# Patient Record
Sex: FEMALE | Race: WHITE | NOT HISPANIC OR LATINO | Employment: STUDENT | URBAN - METROPOLITAN AREA
[De-identification: names, ages, dates, MRNs, and addresses within clinical notes are randomized per-mention and may not be internally consistent; named-entity substitution may affect disease eponyms.]

---
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
## 2017-01-10 ENCOUNTER — OFFICE VISIT (OUTPATIENT)
Dept: FAMILY MEDICINE | Facility: CLINIC | Age: 22
End: 2017-01-10
Payer: COMMERCIAL

## 2017-01-10 VITALS
WEIGHT: 84.19 LBS | SYSTOLIC BLOOD PRESSURE: 104 MMHG | HEART RATE: 106 BPM | OXYGEN SATURATION: 99 % | BODY MASS INDEX: 15.9 KG/M2 | DIASTOLIC BLOOD PRESSURE: 72 MMHG | TEMPERATURE: 99 F | HEIGHT: 61 IN

## 2017-01-10 DIAGNOSIS — J01.00 ACUTE NON-RECURRENT MAXILLARY SINUSITIS: Primary | ICD-10-CM

## 2017-01-10 PROCEDURE — 1159F MED LIST DOCD IN RCRD: CPT | Mod: S$GLB,,, | Performed by: INTERNAL MEDICINE

## 2017-01-10 PROCEDURE — 99214 OFFICE O/P EST MOD 30 MIN: CPT | Mod: S$GLB,,, | Performed by: INTERNAL MEDICINE

## 2017-01-10 RX ORDER — MEDROXYPROGESTERONE ACETATE 150 MG/ML
INJECTION, SUSPENSION INTRAMUSCULAR
Refills: 0 | COMMUNITY
Start: 2016-11-28 | End: 2018-08-27 | Stop reason: ALTCHOICE

## 2017-01-10 RX ORDER — AMOXICILLIN AND CLAVULANATE POTASSIUM 875; 125 MG/1; MG/1
1 TABLET, FILM COATED ORAL 2 TIMES DAILY
Qty: 20 TABLET | Refills: 0 | Status: SHIPPED | OUTPATIENT
Start: 2017-01-10 | End: 2017-01-20

## 2017-01-10 NOTE — PROGRESS NOTES
Subjective:       Patient ID: Nova Johnson is a 21 y.o. female.    Medication List with Changes/Refills   New Medications    AMOXICILLIN-CLAVULANATE 875-125MG (AUGMENTIN) 875-125 MG PER TABLET    Take 1 tablet by mouth 2 (two) times daily.   Current Medications    ADALIMUMAB (HUMIRA PEN) 40 MG/0.8 ML PNKT    Inject 40 mg into the skin every 14 (fourteen) days.    CALCIUM CARBONATE-VIT D3-MIN (CALCIUM-VITAMIN D) 600 MG CALCIUM- 400 UNIT TAB    Take 1 tablet by mouth once daily.    MEDROXYPROGESTERONE (DEPO-PROVERA) 150 MG/ML INJECTION    INJECT 1 INTO THE MUSCLE EVERY 83 DAYS    VITAMIN D2 50,000 UNIT CAPSULE    Take 50,000 Units by mouth every 7 days.    VITAMIN D3 1,000 UNIT CAPSULE    Take 2,000 Units by mouth once daily.       Chief Complaint: Sinus Problem  She has history of Crohn's disease on humira.     She presents with 5 days of sinus pressure and congestion. She does have a frontal headache. No fevers. No chills.  She has some congestion but has not been able to get it out. Mild cough. No sore throat but has ear pressure left > right.  She has gotten a little better but still feels a lot of sinus pressure and congestion.  No vomiting. No diarrhea.  No shortness of breath or wheezing.      Review of Systems   Constitutional: Negative for activity change, appetite change, chills, fatigue and fever.   HENT: Positive for congestion, ear pain, postnasal drip and sinus pressure. Negative for ear discharge, mouth sores, rhinorrhea and sore throat.    Eyes: Negative for pain, discharge and redness.   Respiratory: Positive for cough. Negative for chest tightness, shortness of breath and wheezing.    Gastrointestinal: Negative for abdominal pain, constipation, diarrhea, nausea and vomiting.   Genitourinary: Negative for dysuria.   Musculoskeletal: Negative for arthralgias and neck stiffness.   Skin: Negative for rash.   Neurological: Positive for headaches.   Hematological: Negative for adenopathy.      "  Objective:      Vitals:    01/10/17 0930   BP: 104/72   BP Location: Right arm   Patient Position: Sitting   BP Method: Manual   Pulse: 106   Temp: 98.9 °F (37.2 °C)   TempSrc: Oral   SpO2: 99%   Weight: 38.2 kg (84 lb 3.5 oz)   Height: 5' 1" (1.549 m)     Body mass index is 15.91 kg/(m^2).  Physical Exam    General appearance: alert, no acute distress  Head: atraumatic  Eyes: PERRL, EMOI, normal conjunctiva, no drainage  Ears: tm left bulging with clear fluid, right tm normal, no erythema or pus, canals normal, external ear normal  Nose: boggy erythematous mucosa, no polyps or sores, no rhinorrhea, frontal sinus tenderness  Throat: no erythema, no exudates, tonsils appear normal  Mouth: no sores or lesion, moist mucous membranes  Neck: supple, FROM, no masses, no tenderness  Lymph: no posterior or cervical adenopathy  Lungs: no distress, no retractions, clear to ascultation bilaterally, no wheezing, no rales, no rhonchi  Heart:: Regular rate and rhythm, no murmur  Abdomen: soft, non-tender, no guarding, no rebound, no peritoneal signs, bowel sounds normal, no hepatosplenomegaly, no masses  Skin: no rashes or lesion  Perfusion: good capillary refill, normal pulses      Assessment:       1. Acute non-recurrent maxillary sinusitis        Plan:       Acute non-recurrent maxillary sinusitis  Five day of symptoms that have improved very little.  She continues with congestion. She is immunocompromised so lower clinical threshold to treat.  Will start augmentin bid x 10 days. Advised of the signs of worsening to return to clinic.   -     amoxicillin-clavulanate 875-125mg (AUGMENTIN) 875-125 mg per tablet; Take 1 tablet by mouth 2 (two) times daily.  Dispense: 20 tablet; Refill: 0      Return if symptoms worsen or fail to improve.      "

## 2017-03-07 ENCOUNTER — TELEPHONE (OUTPATIENT)
Dept: PEDIATRIC GASTROENTEROLOGY | Facility: CLINIC | Age: 22
End: 2017-03-07

## 2017-03-07 NOTE — TELEPHONE ENCOUNTER
Called and spoke with mom.  Discussed IBD Clinic starting in March, and asked mom if this is something she and the patient would be interested in.  Mom states pt is now following an adult GI MD.  However, she will discuss with pt and call back if wishing to schedule.

## 2017-07-10 ENCOUNTER — PATIENT MESSAGE (OUTPATIENT)
Dept: PEDIATRIC GASTROENTEROLOGY | Facility: CLINIC | Age: 22
End: 2017-07-10

## 2018-08-09 ENCOUNTER — TELEPHONE (OUTPATIENT)
Dept: GASTROENTEROLOGY | Facility: CLINIC | Age: 23
End: 2018-08-09

## 2018-08-09 NOTE — TELEPHONE ENCOUNTER
----- Message from Scar Singh sent at 8/9/2018  1:46 PM CDT -----  Pt is being referred to Dr Collazo. I have scanned the referral and records into media mgr within [facility]. Please review and contact pt for scheduling,thanks

## 2018-08-10 ENCOUNTER — TELEPHONE (OUTPATIENT)
Dept: GASTROENTEROLOGY | Facility: CLINIC | Age: 23
End: 2018-08-10

## 2018-08-10 NOTE — TELEPHONE ENCOUNTER
----- Message from Patti Mckinley sent at 8/10/2018 11:08 AM CDT -----  Contact: Self- [phone]  Zay- pt called to check the status of her np appt with the IBD clinic- please contact pt at [phone]

## 2018-08-10 NOTE — TELEPHONE ENCOUNTER
Pt is scheduled for a new patient clinic appt with Dr. AARON Collazo on 8/27/2018.     E-mail sent to pt with appt reminder, new patient welcome letter as well as a campus map.    Referring MD's office notified of appt date.

## 2018-08-24 NOTE — PROGRESS NOTES
[facility]          Inflammatory Bowel Disease New Patient Consultation Note         TODAY'S VISIT DATE:  8/24/2018    Reason for Consult:    Chief Complaint   Patient presents with    Crohn's Disease       PCP: Rao Ryder      Referring MD:   Dr. Gavino Soni    History of Present Illness:    Dear. Dr. Gavino Soni    Thank you for requesting a consultation on your patient Nova Johnson who is a 23 y.o. female seen today at the [facility] on 08/24/2018 for inflammatory bowel disease- Crohn's disease.      As you know, Nova Johnson was doing well until 2007 when she began with nausea and intermittent abdominal pain and then by 2/2008 she developed bloody diarrhea and poor weight gain in 2/2008.  On 2/28/08 she had SBFT which was normal. EGD and colonoscopy were done on 3/3/08 which showed normal EGD with biopsies of duodenum normal, biopsies of stomach with chronic inactive H pylori negative gastritis and esophageal biopsies showed some inflammation c/w reflux. Colonoscopy (advanced to sigmoid colon only) on 3/3/18 showed erythema and loss of vascularity from the rectum to the sigmoid colon and biopsies showing chronic active colitis.  After the colonoscopy she was started on prednisone and after a week she went from 4-5 loose bloody BMs/day to 3 BMs/day with no blood and improved appetite. She had improvement of back pain as well. On 3/12/08 she saw Dr. Leedzma who started her on asacol 1.2 g/day and she tapered off of prednisone 6-8 weeks. In early July 2008 she had a few days of nausea/vomiting and diarrhea with blood in stool that resolved with no intervention.  She also had increased abdominal cramping and poor growth.  A repeat EGD/colonoscopy were done to assess these symptoms on 8/4/08. EGD showed same findings with some chronic H pylori negative gastritis and normal duodenal and esophageal biopsies.   Colonoscopy showed erythema and loss of vascularity from sigmoid colon to the cecum with biopsies confirming acute colitis with cryptitis and crypt abscess. Rectum appeared normal though biopsies showed acute colitis with cryptitis. Due to these findings she was placed on prednisone and imuran. Unfortunately after a week of imuran she had chest pain and trouble breathing which resolved after stopping imuran. In approximately 10/2008 she was started on remicade and responded well. Sometime in 2008 before remicade she was switched from asacol to lialda 1 tab/day (not sure why it was switched). Between 2008 and 2011 she was followed at [facility] because Dr Ledezma left.  During that time she stayed on remicade 5 mg/kg every 8 weeks and was feeling well in clinical remission and labs normal (this is per patient's mother, no records). At some point closer to 2011 her remicade frequency was decreased from 5 mg/kg every 8 weeks to 5 mg/kg every 6 weeks and this was done due to having symptoms few weeks before a dose and this helped.     She was seen by Dr. Lehman, her pediatrician on 6/1/11 at which time she had a few day history of loose stool with blood. In addition to the remicade (with premeds-benadryl, tylenol, solumedrol, no reacations) she was on lialda. The plan was to do labs, stool studies, EGD/colonoscopy.  Lialda was discontinued at this visit. On 6/27/11 she had EGD and colonoscopy. EGD showed mild H pylori negative gastritis and there was some minimal esophageal inflammation on biopsies. Colonoscopy showed normal colon though random colon biopsies showed chronic colitis with very rare foci of activity. The TI was into mentioned on this report. Stool studies were negative for infection including C diff. She then saw Dr. Urbano in 8/2011 and due to undetectable infliximab levels with Abs (I don't have results, per mom there was abs), her remicade was discontinued (last dose approximately 6/1/11).   She then was started on Humira induction followed by maintenance 40 mg SC every 2 weeks on 8/2011.  In 10/2012 she began with frequent diffuse severe episodes of abdominal pain with infrequent firm stools with nausea and no help with ducolax. She remained on Humira 40 mg SC every other week and at her clinic visit with Dr. Urbano 11/2012 miralax was recommended. Abdominal xray on 11/6/12 was normal. Between end of 2012 and spring 2014 she was feeling well on Humira 40 mg SC every other week.     In 5/2014 she began with intermittent right sided abdominal pain. Labs were normal and she was referred to a dietician due to poor diet.  She saw Dr. Urbano on 7/28/15 at which time she had some abdominal pain and was on Humira. It was recommended that she be transitioned to adult GI and mention of MRE if pain persisted though pain did not continue.  On 12/15/15 she established care with Dr Soni at which time she remained on Humira 40 mg SC every other week.  Colonoscopy on 1/6/16 showed normal colon though biopsies of the ascending and descending colon showed some mild active colitis. The TI was endoscopically normal though the biopsies showed focal mild ileitis.  There was a 3 mm rectal polyp removed with path c/w mild active proctitis. Patient did not follow up with Dr. Soni again until 3/3/17 at which time she reported lower abdominal pain,mild nausea,  blood in her stool and constipation for a month. Per the note she had been compliant with her Humira.  Trough esoterix humira levels on 3/19/17showed a level of 14 with no Abs.  Due to some active symptoms she was started on entocort and responded well to this. At her follow up visit 7/17/17 she was off of entocort though continued with alternating bowel habits with mild constipation and lower abdominal pain.  MTX 15 mg po weekly was added to her humira.  Unfortunately this was discontinued soon after due to SE of fatigue and nausea. Her abdominal pain resolved on  its own. She was then placed on apriso and continued this with the Humira 40 mg SC every other week. She continued to feel well and last saw Dr. Soni on 3/16/18. Labs on 3/21/18 showed low vit D (on vit D 1000 IU daily), normal vit B12, HBsAg negative, CBC/CMP normal. TB quantiferon negative 18. She underwent a colonoscopy on 18 which showed normal colon and terminal ileum though biopsies throughout the colon showed moderate chronic active colitis and the biopsies of the TI showed mild chronic active enteritis.       Currently she is on Humira 40 mg SC every other week (last dose 18, next dose 18) and apriso 1.5 g/day. She is having 3 soft BMs/day with no blood, urgency or nocturnal bowel movements. She reports chills without fever. She has left eye pain twice a month and then has migraine soon after but no redness or blurry vision.  She has rash on knees and ankles which is worse with hot water.  She has nausea but no vomiting but this usually occurs after she develops pain. She has 2-5/10 LLQ abdominal pain that is achy in nature and intermittent. This pain occurs few times/week and usually lasts for an entire day.  When she has more significant intensity pain this lasts for about 1-2 hours. She has improvement of pain with bowel movements. Patient has no other gastrointestinal/constitutional complaints including no fevers or chills, weight loss, dysphagia.  Also patient does not have any extraintestinal manifestations of their inflammatory bowel disease including no  joint problems, oral ulcers.    Prior Pertinent Surgeries:  None    Pertinent Endoscopy/Imagin2008 SBFT: no significant abnormality, no indication of IBD  3/3/2008 EGD: normal (path-duodenum: normal) (path-stomach: chronic non-active, non-atrophic H. pylori negative gastritis with reactive surface change) (path-esophagus: minimal chronic inflammation and mild features of reflux)  3/3/2008 Colonoscopy: very erythematous  and loss of vascularity from rectum to sigmoid colon--scope not advanced beyond this point (path-colon and rectum: chronic active colitis)  8/4/2008 EGD: mild erythema in the duodenum (path: normal); normal stomach (path: chronic H. pylori negative gastritis) and esophagus (path: normal)  8/4/2008 Colonoscopy: erythema and loss of vascularity in the sigmoid colon extending proximally to the cecum (path-cecum, transverse, descending, sigmoid: acute colitis with cryptitis and crypt abscess); rectum appeared normal (path: acute colitis with cryptitis)  11/4/2008 KUB: normal  6/27/2011 EGD: normal esophagus (path: minimal chronic inflammation), normal examined duodenum (path: normal); gastritis (path: mild chronic non-active H. pylori negative gastritis)  6/27/2011 Colonoscopy: colon appeared normal (Path-colon and rectum: chronic colitis with very rare foci of activity), no mention of terminal ileum  11/6/2012 abdominal xray 1 view: normal  1/6/2016 Colonoscopy: entire examined colon (path-ascending, descending: mild active colitis); terminal ileum appeared normal (path: focal mild ileitis); 3 mm polyp in the rectum-removed (path: mild active proctitis)  8/1/2018 Colonoscopy: normal examined colon (path-cecum, ascending, transverse, descending, sigmoid, rectum: moderate chronic active colitis with granulomas); terminal ileum appeared normal (path-mild chronic active enteritis)    Pertinent Labs:  3/21/2018: Vitamin D 22 (), B12 673, folate 20.3, Ferritin 39, HBsAg Non-Reactive, WBC 7.8, RBC 4.0, Hgb 12.7, Hct 37.5, MCV 93.8, platelets 347, BUN 10, creatinine 10, albumin 4.6, total bili 0.4, alk phos 71, AST 18, ALT 18  4/23/2018: TB Quantiferon Negative   Lab Results   Component Value Date    SEDRATE 11 11/06/2012    CRP <0.10 11/06/2012     Lab Results   Component Value Date    TTGIGA 15.8 (H) 11/06/2012     06/01/2011     Lab Results   Component Value Date    TSH 0.720 05/09/2011     Lab Results    Component Value Date    XZXZTNZC74AY 29 (L) 11/06/2012     No results found for: HEPBSAG, HEPBCAB, HEPCAB  No results found for: LCJ85PQFS  No results found for: NIL, TBAG, TBAGNIL, MITOGENNIL, TBGOLD, TSPOTSCREN  No results found for: TPTMINTERP, TPMTRESULT  Lab Results   Component Value Date    CDIFFICILEAN Negative 06/01/2011    CDIFFTOX Negative 06/01/2011     No results found for: CALPROTECTIN    Therapeutic Drug Monitoring Labs:  3/19/2017: Esoterix Humira Levels 14, Abs < 25    Prior IBD Therapies:  Prednisone- effective  Entocort- effective  Asacol 1.2 gm/day- ineffective  Imuran-respiratory problems  Remicade 8/2008-stopped 6/2011- lost reponse with undetectable levels  Lialda  MTX 15 mg PO weekly--fatigue, nausea    Current IBD Therapies:  Humira 40 mg SC every other week (started 8/2011)  Apriso 1.5 g/day    Vaccinations:  Influenza (inactive):  Not had due to past SE  Pneumococcal PCV 13: never had  Pneumococcal PCV 23: never had  Tetanus (TdaP): approximately 2015  HPV (males and females ages 19-27 yo):   Had this in highschool   Meningococcal (risk factors- complement component deficiency, spleen damage or splenectomy, HIV, traveling to endemic areas, college student residing in residence [place],  recruits):  Not sure  Hepatitis B:  Not sure  No results found for: HEPBSAB  Hepatitis A (risk factors- traveling to high endemic areas, chronic liver disease, clotting factor disorders, MSM, illicit drug users):   Not sure  No results found for: HEPAIGG  MMR (live vaccine):    Not sure  Chickenpox status/Varicella (live vaccine): had chickenpox vaccine  No results found for: VARICELLAZOS, VARICELLAINT  Shingrix:    There is no immunization history on file for this patient.    NSAID use/indication:  No    Narcotic use:  No    Alternative/Complementary Meds for IBD:  Calcium and vit D daily    Review of Systems   Constitutional: Positive for chills. Negative for fever and weight loss.   Eyes: Positive  "for pain. Negative for blurred vision and redness.   Respiratory: Negative for cough and shortness of breath.    Cardiovascular: Negative for chest pain.   Gastrointestinal: Positive for abdominal pain and nausea. Negative for heartburn and vomiting.   Genitourinary: Negative for dysuria and hematuria.   Musculoskeletal: Negative for back pain.   Skin: Positive for rash.   Psychiatric/Behavioral: Negative for depression. The patient is not nervous/anxious.      Medical/Surgical History:    has a past medical history of Crohn's disease.   has a past surgical history that includes Polypectomy.    Family History:   family history includes Celiac disease in her cousin; Crohn's disease in her cousin; Other in her other.    Social History:    reports that  has never smoked. she has never used smokeless tobacco. She reports that she does not drink alcohol or use drugs.    Review of patient's allergies indicates:   Allergen Reactions    Azathioprine      Shortness of breath    Sulfa (sulfonamide antibiotics)      Chest pain and difficulty breathing     Current Medications:   Outpatient Medications Marked as Taking for the 8/27/18 encounter (Office Visit) with Praful Collazo MD   Medication Sig Dispense Refill    adalimumab (HUMIRA PEN) 40 mg/0.8 mL PnKt Inject 40 mg into the skin every 14 (fourteen) days. 2 each 6    calcium carbonate (OS-YUMIKO) 600 mg calcium (1,500 mg) Tab Take 1,200 mg by mouth once.      mesalamine (APRISO) 0.375 gram Cp24 Take 1.5 g by mouth once daily.      VITAMIN D3 1,000 unit capsule Take 2,000 Units by mouth once daily.  5     Vital Signs:  /67 (BP Location: Right arm, Patient Position: Sitting)   Pulse 90 Comment: O2: 98%  Temp 98.2 °F (36.8 °C)   Ht 5' 1" (1.549 m)   Wt 39.8 kg (87 lb 11.9 oz)   BMI 16.58 kg/m²     Physical Exam   Constitutional: She is oriented to person, place, and time. She appears well-developed.   thin   HENT:   Mouth/Throat: Oropharynx is clear and moist. " No oral lesions.   No oral ulcers or thrush   Eyes: Conjunctivae are normal. Pupils are equal, round, and reactive to light.   Cardiovascular: Normal rate and regular rhythm.   Pulmonary/Chest: Effort normal and breath sounds normal.   Abdominal: Soft. There is no tenderness.   Musculoskeletal:        Right lower leg: She exhibits no swelling.        Left lower leg: She exhibits no swelling.   Neurological: She is alert and oriented to person, place, and time.   Skin: No rash noted.   Psychiatric: She has a normal mood and affect.   Nursing note and vitals reviewed.    Labs: reviewed and pertinent noted above    Assessment/Plan:  Nova Johnson is a 23 y.o. female with Ileocolonic Crohn's Disease.  She began with symptoms of nausea and intermittent abdominal pain in 2007 that progressed into bloody diarrhea and poor weight gain by 2/2008.  She had a normal SBFT on 2/28/2008.  EGD on 3/3/2018 was normal and biopsies showed normal duodenum, chronic inactive H. Pylori negative gastritis, and esophageal biopsies showed inflammation consistent with reflux.  Colonoscopy on 3/3/2018 (only advanced to sigmoid colon) showed erythema and loss of vascularity from the rectum to the sigmoid colon with biopsies positive for chronic active colitis.  She was initially started on prednisone for induction then was started on asacol 1.2 gm/day and eventually tapered off of prednisone within 6-8 weeks.  She had a repeat colonoscopy in 8/2008 due to ongoing symptoms which showed continued inflammation from the sigmoid colon to the cecum.  Due to these findings, she was given another course of prednisone and started on imuran.  Unfortunately, after 1 week of imuran she developed CP and SOB so this was discontinued.  Asacol was switched to Lialda 1.2 gm/day (unsure of why this switch was made).  She was then started on remicade 5 mg/kg every 8 weeks in 10/2008 and responded well.  From 4150-6397 [facility] and per mother she remained in clinical remission with normal labs (we do not have these records).  Sometime during 2011, her remicade frequency was decreased to 5 mg/kg every 6 weeks due to having symptoms a few weeks prior to her next dose which helped.  Lialda was discontinued in 6/2011.  She had another colonoscopy in 6/2011 that again showed active disease.  She established care with Dr. Urbano in 8/2011 and had undectectable IFX levels with ABs so remicade was discontinued with her last dose approximately 6/1/2011.  She was started on Humira in 8/2011 with induction dosing followed by maintenance dosing of 40 mg SC every 2 weeks.  She then transitioned to adult GI care and established care with Dr. Narayanan and continued humira 40 mg SC every 2 weeks.  Colonoscopy 1/2016 showed continued active colitis and focal mild ileitis on biopsies and she continued on humira 40 mg SC every 2 weeks.  Trough humira levels and biopsies were drawn on 3/19/2017 which showed a level of 14 with no ABs.  She continued on humira 40 mg SC every 14 days and was also started on entocort 9 mg PO daily due to some active symptoms and tapered off of entocort by 7/2017.  Apriso was then added to her treatment regimen sometime in 2017.  She had another colonoscopy in 8/2018 that looked normal endoscopically though biopsies showed moderate active colitis throughout and mild active enteritis.  Currently she is on humira 40 mg SC every 14 days and apriso 1/5 gm/day with 3 soft BMs/day with no blood, urgency, or nocturnal BMs.  She also has intermittent LLQ 2-5/10 abdominal pain that is aching in nature.      Patient has microscopic but not macroscopic inflammation on last colonoscopy though she continues with abdominal pain intermittently.  I don't know if her current symptoms are related to active Crohn's or other causes such as IBS, SIBO, etc. Given there is a discrepancy between endoscopic findings and histologic findings with  symptoms,  I will restage her disease with stool calprotectin, MRE and then proceed with patency and VCE if possible.  I may consider repeat colonoscopy depending on these findings. We will also get humira levels/abs done to see if we can further optimize humira as well. We discussed diagnosis of IBD vs IBS and that IBS is diagnosis of exclusion once we know that her Crohns is in remission. Further treatment recommendations will depend on above tests    # Ileocolonic Crohn's Disease with only microscopic inflammation per last report:  - I had a long discussion with patient regarding epidemiology, potential etiologies, associations and triggers (avoiding NSAIDS, using antibiotics with caution,stress and smoking effects on disease state), diagnosis, management goals and treatment options   - stool calprotectin- pt to turn in this week  - continue Humira 40 mg SC every other week  - MRE  - patency capsule and if passes then will proceed with VCE  - discussed VCE and risks of VCE with patient today (NP Brianne Rios did this)  - may consider colonoscopy though this will be deferred until above tests and once colored pics are reviewed from recent colonoscopy and pt plans to bring these to me  - basic labs: CBC, CMP, ESR, CRP, vitamin B12 (normal 3/2018-outside hospital), HCV Ab, HIV, thyroid testing, celiac Ab testing   - drug monitoring labs: TPMT, TB quantiferon (negative outside hospital 18), Hep B testing (HBsAg neg 3/2018 outside hospital, HBtotalcoreAb-ordered today)  - other drug monitorin18 trough (1-2 days early, dose 18) [facility] infliximab levels/abs     # IBD specific health maintenance:  Colorectal cancer risk:    Risks factors: >1/3 of colon involved with colitis and >8-10 years of IBD duration  - Distribution of colonic disease:  pancolitis  - Year of symptom onset:   - colonoscopy:  every 1-2 years for surveillance once in endoscopic remission    Pap smear recommended yearly    Opthamologic exam recommended yearly- due now  Dermatologic exam recommended yearly- due now    Bone health:  Risk of osteopenia/osteoporosis:  Risks factors: none  Vitamin D: vitamin D level ordered today    Vaccine counseling:  - No LIVE VACCINES  - VZV IgG, HAV IgG, HBsAb today   - referral to Infectious Disease clinic to get up to date on vaccinations    Follow up: to be scheduled later approximately 2 weeks after colonoscopy    Thank you again for sending Nova Johnson to see Dr. Praful Collazo today at the [facility]. Please don't hesitate to contact Dr. Collazo if there are any questions regarding this evaluation, or if you have any other patients with inflammatory bowel disease for whom you would like a consultation. You can reach Dr. Collazo at [phone] or by email at [email or web address]    History, physical exam, assessment and plan were performed by me personally. NP, Brianne Rios was present during entire visit     Praful Collazo MD  Department of Gastroenterology  Medical Director, Inflammatory Bowel Disease

## 2018-08-27 ENCOUNTER — LAB VISIT (OUTPATIENT)
Dept: LAB | Facility: HOSPITAL | Age: 23
End: 2018-08-27
Attending: INTERNAL MEDICINE
Payer: COMMERCIAL

## 2018-08-27 ENCOUNTER — OFFICE VISIT (OUTPATIENT)
Dept: GASTROENTEROLOGY | Facility: CLINIC | Age: 23
End: 2018-08-27
Payer: COMMERCIAL

## 2018-08-27 VITALS
SYSTOLIC BLOOD PRESSURE: 109 MMHG | DIASTOLIC BLOOD PRESSURE: 67 MMHG | TEMPERATURE: 98 F | HEIGHT: 61 IN | BODY MASS INDEX: 16.57 KG/M2 | HEART RATE: 90 BPM | WEIGHT: 87.75 LBS

## 2018-08-27 DIAGNOSIS — R11.0 NAUSEA: ICD-10-CM

## 2018-08-27 DIAGNOSIS — M85.80 DECREASED BONE DENSITY: ICD-10-CM

## 2018-08-27 DIAGNOSIS — Z79.899 LONG TERM CURRENT USE OF IMMUNOSUPPRESSIVE DRUG: ICD-10-CM

## 2018-08-27 DIAGNOSIS — K50.80 CROHN'S DISEASE OF BOTH SMALL AND LARGE INTESTINE WITHOUT COMPLICATION: ICD-10-CM

## 2018-08-27 DIAGNOSIS — K50.80 CROHN'S DISEASE OF BOTH SMALL AND LARGE INTESTINE WITHOUT COMPLICATION: Primary | ICD-10-CM

## 2018-08-27 LAB
ALBUMIN SERPL BCP-MCNC: 4 G/DL
ALP SERPL-CCNC: 79 U/L
ALT SERPL W/O P-5'-P-CCNC: 38 U/L
ANION GAP SERPL CALC-SCNC: 9 MMOL/L
AST SERPL-CCNC: 31 U/L
BASOPHILS # BLD AUTO: 0.05 K/UL
BASOPHILS NFR BLD: 0.8 %
BILIRUB SERPL-MCNC: 0.8 MG/DL
BUN SERPL-MCNC: 9 MG/DL
CALCIUM SERPL-MCNC: 9.6 MG/DL
CHLORIDE SERPL-SCNC: 103 MMOL/L
CO2 SERPL-SCNC: 27 MMOL/L
CREAT SERPL-MCNC: 0.7 MG/DL
CRP SERPL-MCNC: 0.1 MG/L
DIFFERENTIAL METHOD: ABNORMAL
EOSINOPHIL # BLD AUTO: 0 K/UL
EOSINOPHIL NFR BLD: 0.6 %
ERYTHROCYTE [DISTWIDTH] IN BLOOD BY AUTOMATED COUNT: 11.9 %
ERYTHROCYTE [SEDIMENTATION RATE] IN BLOOD BY WESTERGREN METHOD: 42 MM/HR
EST. GFR  (AFRICAN AMERICAN): >60 ML/MIN/1.73 M^2
EST. GFR  (NON AFRICAN AMERICAN): >60 ML/MIN/1.73 M^2
GLUCOSE SERPL-MCNC: 72 MG/DL
HBV CORE AB SERPL QL IA: NEGATIVE
HBV SURFACE AB SER-ACNC: POSITIVE M[IU]/ML
HCG INTACT+B SERPL-ACNC: <1.2 MIU/ML
HCT VFR BLD AUTO: 40.4 %
HCV AB SERPL QL IA: NEGATIVE
HEPATITIS A ANTIBODY, IGG: POSITIVE
HGB BLD-MCNC: 13.5 G/DL
HIV 1+2 AB+HIV1 P24 AG SERPL QL IA: NEGATIVE
IGA SERPL-MCNC: 484 MG/DL
IMM GRANULOCYTES # BLD AUTO: 0.01 K/UL
IMM GRANULOCYTES NFR BLD AUTO: 0.2 %
LYMPHOCYTES # BLD AUTO: 1.8 K/UL
LYMPHOCYTES NFR BLD: 28.5 %
MCH RBC QN AUTO: 31.8 PG
MCHC RBC AUTO-ENTMCNC: 33.4 G/DL
MCV RBC AUTO: 95 FL
MONOCYTES # BLD AUTO: 0.7 K/UL
MONOCYTES NFR BLD: 11.3 %
NEUTROPHILS # BLD AUTO: 3.7 K/UL
NEUTROPHILS NFR BLD: 58.6 %
NRBC BLD-RTO: 0 /100 WBC
PLATELET # BLD AUTO: 365 K/UL
PMV BLD AUTO: 10.9 FL
POTASSIUM SERPL-SCNC: 3.5 MMOL/L
PROT SERPL-MCNC: 8.2 G/DL
RBC # BLD AUTO: 4.24 M/UL
SODIUM SERPL-SCNC: 139 MMOL/L
T4 FREE SERPL-MCNC: 1.07 NG/DL
TSH SERPL DL<=0.005 MIU/L-ACNC: 0.89 UIU/ML
WBC # BLD AUTO: 6.22 K/UL

## 2018-08-27 PROCEDURE — 84439 ASSAY OF FREE THYROXINE: CPT

## 2018-08-27 PROCEDURE — 86703 HIV-1/HIV-2 1 RESULT ANTBDY: CPT

## 2018-08-27 PROCEDURE — 86790 VIRUS ANTIBODY NOS: CPT

## 2018-08-27 PROCEDURE — 86706 HEP B SURFACE ANTIBODY: CPT

## 2018-08-27 PROCEDURE — 99999 PR PBB SHADOW E&M-EST. PATIENT-LVL IV: CPT | Mod: PBBFAC,,, | Performed by: INTERNAL MEDICINE

## 2018-08-27 PROCEDURE — 86704 HEP B CORE ANTIBODY TOTAL: CPT

## 2018-08-27 PROCEDURE — 84443 ASSAY THYROID STIM HORMONE: CPT

## 2018-08-27 PROCEDURE — 82784 ASSAY IGA/IGD/IGG/IGM EACH: CPT

## 2018-08-27 PROCEDURE — 36415 COLL VENOUS BLD VENIPUNCTURE: CPT

## 2018-08-27 PROCEDURE — 86140 C-REACTIVE PROTEIN: CPT

## 2018-08-27 PROCEDURE — 84702 CHORIONIC GONADOTROPIN TEST: CPT

## 2018-08-27 PROCEDURE — 85652 RBC SED RATE AUTOMATED: CPT

## 2018-08-27 PROCEDURE — 85025 COMPLETE CBC W/AUTO DIFF WBC: CPT

## 2018-08-27 PROCEDURE — 86787 VARICELLA-ZOSTER ANTIBODY: CPT

## 2018-08-27 PROCEDURE — 80053 COMPREHEN METABOLIC PANEL: CPT

## 2018-08-27 PROCEDURE — 99245 OFF/OP CONSLTJ NEW/EST HI 55: CPT | Mod: S$GLB,,, | Performed by: INTERNAL MEDICINE

## 2018-08-27 PROCEDURE — 86803 HEPATITIS C AB TEST: CPT

## 2018-08-27 PROCEDURE — 83516 IMMUNOASSAY NONANTIBODY: CPT

## 2018-08-27 RX ORDER — MESALAMINE 0.38 G/1
1.12 CAPSULE, EXTENDED RELEASE ORAL DAILY
COMMUNITY
End: 2019-03-06

## 2018-08-27 RX ORDER — CALCIUM CARBONATE 600 MG
1200 TABLET ORAL ONCE
COMMUNITY
End: 2019-03-06 | Stop reason: SDUPTHER

## 2018-08-27 NOTE — LETTER
August 28, 2018      Gavino Soni MD  [address]   [facility], [facility] [address]           [facility]  [address]  [facility] [address]  Phone: [phone]  Fax: [phone]          Patient: Nova Johnson   MR Number: [id number]   YOB: 1995   Date of Visit: 8/27/2018       Dear Dr. Gavino Soni:    Thank you for referring Nova Johnson to me for evaluation. Attached you will find relevant portions of my assessment and plan of care.    If you have questions, please do not hesitate to call me. I look forward to following Nova Johnson along with you.    Sincerely,    Praful Collazo MD    Enclosure  CC:  No Recipients    If you would like to receive this communication electronically, please contact [email or web address] or [phone] to request more information on [facility] [employer] access.    For providers and/or their staff who would like to refer a patient to [facility], please contact us through our one-stop-shop provider referral line, [facility], at [phone].    If you feel you have received this communication in error or would no longer like to receive these types of communications, please e-mail [email or web address]

## 2018-08-27 NOTE — PATIENT INSTRUCTIONS
Instructions:  - please bring the colored pictures of the colonoscopy by Dr. Soni to your next appointment  - turn in stool calprotectin this week  - labs today  - repeat humira levels/abs on 9/7/18--can be drawn at [facility]  - MRE schedule ASAP   - Increase your Vitamin D3 to 2000 IU daily   - please email us your vaccination records  - Avoid all NSAIDs (Advil, Ibuprofen, Motrin, Aspirin, Naprosyn, Aleve)  - Yearly Pap Smears recommended--need a pelvic exam ASAP  - Yearly Eye exam  - Yearly Skin exam--wear sun block and hats, see optometry for eye pain   - Use antibiotics with caution  - For Dental Procedures, use antibiotics only if absolutely necessary  - Please make sure you establish care with a PCP  - If you have to take antibiotics for any infection, please take a 2 week course of OTC Florastor 1 capsule twice daily probiotic after completion of the antibiotic course  - Vaccines recommended--ID Referral placed  Flu shot yearly  Tetanus every 10 years  Hepatitis A series  Hepatitis B series   Pneumonia 13  (PCV13) vaccine initial  Pneumonia 23 (PPSV23) vaccine 1 year after PCV13, then an additional dose in 5 years, then again at age 67  Shingrix series  - Follow up to be determined, possibly 2 weeks after colonoscopy

## 2018-08-28 LAB — TTG IGA SER IA-ACNC: 16 UNITS

## 2018-08-29 LAB
VARICELLA INTERPRETATION: POSITIVE
VARICELLA ZOSTER IGG: 2.94 ISR

## 2018-08-30 ENCOUNTER — PATIENT MESSAGE (OUTPATIENT)
Dept: GASTROENTEROLOGY | Facility: CLINIC | Age: 23
End: 2018-08-30

## 2018-08-31 ENCOUNTER — PATIENT MESSAGE (OUTPATIENT)
Dept: GASTROENTEROLOGY | Facility: CLINIC | Age: 23
End: 2018-08-31

## 2018-08-31 ENCOUNTER — HOSPITAL ENCOUNTER (OUTPATIENT)
Dept: RADIOLOGY | Facility: HOSPITAL | Age: 23
Discharge: HOME | End: 2018-08-31
Attending: INTERNAL MEDICINE
Payer: COMMERCIAL

## 2018-08-31 DIAGNOSIS — K50.80 CROHN'S DISEASE OF BOTH SMALL AND LARGE INTESTINE WITHOUT COMPLICATION: ICD-10-CM

## 2018-08-31 PROCEDURE — A9585 GADOBUTROL INJECTION: HCPCS | Performed by: INTERNAL MEDICINE

## 2018-08-31 PROCEDURE — 74183 MRI ABD W/O CNTR FLWD CNTR: CPT | Mod: 26,,, | Performed by: RADIOLOGY

## 2018-08-31 PROCEDURE — 25500020 PHARM REV CODE 255: Performed by: INTERNAL MEDICINE

## 2018-08-31 PROCEDURE — 72197 MRI PELVIS W/O & W/DYE: CPT | Mod: 26,,, | Performed by: RADIOLOGY

## 2018-08-31 PROCEDURE — 72197 MRI PELVIS W/O & W/DYE: CPT | Mod: TC

## 2018-08-31 RX ORDER — GADOBUTROL 604.72 MG/ML
10 INJECTION INTRAVENOUS
Status: COMPLETED | OUTPATIENT
Start: 2018-08-31 | End: 2018-08-31

## 2018-08-31 RX ADMIN — GADOBUTROL 10 ML: 604.72 INJECTION INTRAVENOUS at 10:08

## 2018-09-05 ENCOUNTER — PATIENT MESSAGE (OUTPATIENT)
Dept: GASTROENTEROLOGY | Facility: CLINIC | Age: 23
End: 2018-09-05

## 2018-09-05 DIAGNOSIS — K50.819 CROHN'S DISEASE OF SMALL AND LARGE INTESTINES WITH COMPLICATION: Primary | ICD-10-CM

## 2018-09-06 NOTE — TELEPHONE ENCOUNTER
Tell her to schedule as soon as possible and to avoid the prednisone until she does so.  Transfer her to the schedulers and let me know what time and date after she schedules. 9/18 is fine too

## 2018-09-06 NOTE — TELEPHONE ENCOUNTER
Called and spoke with pt  I explained Dr Collazo sent her a portal message and I explained I was going to read it to her.   I read the portal message and pt stated she will not be able to do the scope next week.  She then asked when the next available appointment would be and I explained I was unsure as I do not schedule the scopes. I explained I will check on that and I will see how Dr Collazo will like to proceed since she can not make it on 09/11/2018 and I will be in touch.  I will also discuss the Prednisone as well

## 2018-09-07 ENCOUNTER — LAB VISIT (OUTPATIENT)
Dept: LAB | Facility: HOSPITAL | Age: 23
End: 2018-09-07
Attending: INTERNAL MEDICINE
Payer: COMMERCIAL

## 2018-09-07 DIAGNOSIS — Z12.11 SPECIAL SCREENING FOR MALIGNANT NEOPLASMS, COLON: Primary | ICD-10-CM

## 2018-09-07 DIAGNOSIS — K50.80 CROHN'S DISEASE OF BOTH SMALL AND LARGE INTESTINE WITHOUT COMPLICATION: ICD-10-CM

## 2018-09-07 PROCEDURE — 80299 QUANTITATIVE ASSAY DRUG: CPT

## 2018-09-07 PROCEDURE — 36415 COLL VENOUS BLD VENIPUNCTURE: CPT | Mod: PO

## 2018-09-07 PROCEDURE — 83993 ASSAY FOR CALPROTECTIN FECAL: CPT

## 2018-09-07 RX ORDER — SODIUM, POTASSIUM,MAG SULFATES 17.5-3.13G
180 SOLUTION, RECONSTITUTED, ORAL ORAL
Qty: 1 BOTTLE | Refills: 0 | Status: SHIPPED | OUTPATIENT
Start: 2018-09-07 | End: 2018-10-08 | Stop reason: ALTCHOICE

## 2018-09-07 NOTE — TELEPHONE ENCOUNTER
Pt returned my call and left message     Called and spoke with pt  I explained message below and transferred her to the Endo schedulers.  Before transferring I gave them the direct line in case transfer went wrong.    She will ask the schedulers to notify me once scheduled

## 2018-09-12 ENCOUNTER — OFFICE VISIT (OUTPATIENT)
Dept: INFECTIOUS DISEASES | Facility: CLINIC | Age: 23
End: 2018-09-12
Payer: COMMERCIAL

## 2018-09-12 ENCOUNTER — CLINICAL SUPPORT (OUTPATIENT)
Dept: INFECTIOUS DISEASES | Facility: CLINIC | Age: 23
End: 2018-09-12
Payer: COMMERCIAL

## 2018-09-12 VITALS
TEMPERATURE: 98 F | HEIGHT: 61 IN | SYSTOLIC BLOOD PRESSURE: 113 MMHG | HEART RATE: 110 BPM | WEIGHT: 90.19 LBS | DIASTOLIC BLOOD PRESSURE: 85 MMHG | BODY MASS INDEX: 17.03 KG/M2

## 2018-09-12 DIAGNOSIS — Z79.899 LONG TERM CURRENT USE OF IMMUNOSUPPRESSIVE DRUG: Primary | ICD-10-CM

## 2018-09-12 PROCEDURE — 99204 OFFICE O/P NEW MOD 45 MIN: CPT | Mod: S$GLB,,, | Performed by: PHYSICIAN ASSISTANT

## 2018-09-12 PROCEDURE — 90670 PCV13 VACCINE IM: CPT | Mod: S$GLB,,, | Performed by: INTERNAL MEDICINE

## 2018-09-12 PROCEDURE — 99999 PR PBB SHADOW E&M-EST. PATIENT-LVL III: CPT | Mod: PBBFAC,,, | Performed by: PHYSICIAN ASSISTANT

## 2018-09-12 PROCEDURE — 90471 IMMUNIZATION ADMIN: CPT | Mod: S$GLB,,, | Performed by: INTERNAL MEDICINE

## 2018-09-12 PROCEDURE — 3008F BODY MASS INDEX DOCD: CPT | Mod: CPTII,S$GLB,, | Performed by: PHYSICIAN ASSISTANT

## 2018-09-12 NOTE — LETTER
September 14, 2018      Praful Collazo MD  [address]  [facility] [address]           [facility] - Infectious Diseases  [address]  [facility] [address]  Phone: [phone]  Fax: [phone]          Patient: Nova Johnson   MR Number: [id number]   YOB: 1995   Date of Visit: 9/12/2018       Dear Dr. Praful Collazo:    Thank you for referring Nova Johnson to me for evaluation. Attached you will find relevant portions of my assessment and plan of care.    If you have questions, please do not hesitate to call me. I look forward to following Nova Johnson along with you.    Sincerely,    Cayden Solorio Jr., PA    Enclosure  CC:  No Recipients    If you would like to receive this communication electronically, please contact [email or web address] or [phone] to request more information on [employer] Link access.    For providers and/or their staff who would like to refer a patient to [facility], please contact us through our one-stop-shop provider referral line, [facility], at [phone].    If you feel you have received this communication in error or would no longer like to receive these types of communications, please e-mail [email or web address]

## 2018-09-12 NOTE — PROGRESS NOTES
Pre Biologic Response Modifier Therapy Consult  BMR Recipient Evaluation    Requesting Physician: Alcira Collazo MD    Reason for Visit:    Chief Complaint   Patient presents with    Crohn's Disease     History of Present Illness  Nova Johnson is a 23 y.o. year old White female with advanced Crohns Disease currently being evaluated for prior to starting biologic response modifer (BRM) therapy.  Patient has been on Humira x 5 years. She denies any immune suppression related infection.  Patient denies any recent fever, chills, or infective infective illnesses.      1) Do you have a history of:    YES NO   Diabetes                 []       [x]   Bone/ Foot Infection/Bone Infection   []  [x]   Surgical Removal of Spleen    []  [x]       2) Have you had recurrent infections involving:             YES NO  Sinus infections  []  [x]  Sore Throat   []  [x]                             Prostate Infections  []  []  .                         Bladder Infections  []  [x]                                 Kidney Infections  []  [x]        Intestinal Infections  []  [x]   Skin Infections   []  [x]                   Reproductive Infections             n  Periodontal Disease             n      3)Have you ever had: YES     NO UNKNOWN      Chicken Pox   []  [x]  [] Got Varivax as child                Shingles   []  [x]  []                Orolabial Herpes             []  [x]  []       Genital Herpes  []  [x]  []           Cytomegalovirus  []  [x]  []               Palmer-Barr Virus  []  [x]  []              Genital Warts   []  [x]  []                Hepatitis A   []  [x]  []             Hepatitis B   []  [x]  []                Hepatitis C   []  [x]  []                 Syphilis   []  [x]  []                Gonorrhea   []  [x]  []                 Pelvic Inflammatory  []  [x]  []   Disease   []  [x]  []                    Chlamydia Infection  []  [x]  []                Intestinal parasites        or worms   []  [x]  []                  Fungal Infections  []  [x]  []                Blood Infections  []  [x]  []     Comment:       4) Have you ever been exposed   YES NO  to someone with tuberculosis?  []  [x]   If yes, what treatment did you receive:     5) What states have you lived in? [place]    6) What countries have you visited for more than 2 weeks?  none                        YES NO  7) Did you have any associated infections?     []  [x]     8) Are you planning to travel outside the           [place] after starting BRMs?    [x]   []           Household                  YES NO  9 Do you have pets living in your house?   [x]   []             If yes, describe: Dogs    Do you spend time or live on a farm or                 have livestock or other farm animals?   []  [x]   If yes, which ones:Chickens- patient not in contact    Do you have a fish tank?     []  [x]                       Do you have a litter box?     []  [x]                        Do you fish or hunt?      []  [x]                       Do you clean or skin fish or animals?   []  [x]                      Do you consume raw or undercooked                meat, fish, or shellfish? Leena    [x]  []         10) What occupations have you had? School and Work ()            11) Patient reports hobby to be work and school                           12)Do you garden or otherwise  YES NO   work in the soil?    []  [x]   13)Do you hike, camp, or spend   time in wooded areas?   []  [x]                           14) The patient's immunization history was reviewed.   Have you ever received:  YES NO UNKNOWN DATES  Routine Childhood vaccines  [x]  []  []                Influenza vaccine   []  [x]  []  Makes her sick      Pneumonia    [x]  []  []as child       Tetanus-diptheria   [x]  []  [] thinks 3 yrs ago  Hepatitis A vaccine series       [x]  []  []       Hepatitis B vaccine series       [x]  []  []        Meningitis vaccine   [x]  []  []     Varicella vaccine   [x]  []  Had Varivax -  primary series                 Based on the patients immunization history and serologies, immunizations were ordered:         Ordered  Not Ordered  Influenza Vaccine     []    [x]   Hepatitis A series at 0, 6 months   []    [x]   Hepatitis B sries at 0, 1, and 6 months  []    [x]   Hepatitis B High Dose series 0,1, and 6 months []    [x]   Prevnar      [x]    []   Pneumovax      [x]    []   TDap       []    [x]   Zoster       []    [x]   Menactra      []    [x]   HiB       []    [x]               The patient was encouraged to contact us about any problems that may develop after immunization and possible side effects were reviewed.       Allergies: Azathioprine and Sulfa (sulfonamide antibiotics)    There is no immunization history on file for this patient.  Past Medical History:   Diagnosis Date    Crohn's disease      History reviewed. No pertinent surgical history.   Social History     Socioeconomic History    Marital status: Single     Spouse name: Not on file    Number of children: Not on file    Years of education: Not on file    Highest education level: Not on file   Social Needs    Financial resource strain: Not on file    Food insecurity - worry: Not on file    Food insecurity - inability: Not on file    Transportation needs - medical: Not on file    Transportation needs - non-medical: Not on file   Occupational History    Not on file   Tobacco Use    Smoking status: Never Smoker    Smokeless tobacco: Never Used   Substance and Sexual Activity    Alcohol use: No    Drug use: No    Sexual activity: No   Other Topics Concern    Not on file   Social History Narrative    Lives with both parents.  Does well in school. Majoring in Education.        Review of Systems   Constitution: Positive for decreased appetite. Negative for chills, fever, weakness, malaise/fatigue, night sweats, weight gain and weight loss.   HENT: Positive for sore throat. Negative for congestion, ear pain, hearing loss,  hoarse voice and tinnitus.    Eyes: Negative for blurred vision, redness and visual disturbance.   Cardiovascular: Negative for chest pain, leg swelling and palpitations.   Respiratory: Positive for sputum production. Negative for cough, hemoptysis, shortness of breath and wheezing.    Endocrine: Negative for cold intolerance and heat intolerance.   Hematologic/Lymphatic: Negative for adenopathy. Does not bruise/bleed easily.   Skin: Negative for dry skin, itching, rash and suspicious lesions.   Musculoskeletal: Positive for back pain and joint pain. Negative for myalgias and neck pain.   Gastrointestinal: Positive for abdominal pain and diarrhea. Negative for constipation, heartburn, nausea and vomiting.   Genitourinary: Negative for dysuria, flank pain, frequency, hematuria, hesitancy and urgency.   Neurological: Positive for headaches. Negative for dizziness, numbness and paresthesias.   Psychiatric/Behavioral: Negative for depression and memory loss. The patient does not have insomnia and is not nervous/anxious.    Allergic/Immunologic: Negative for environmental allergies, HIV exposure, hives and persistent infections.     Physical Exam   Constitutional: She is oriented to person, place, and time. She appears well-developed and well-nourished. No distress.   HENT:   Head: Normocephalic and atraumatic.   Mouth/Throat: She does not have dentures. No oral lesions. Normal dentition. No dental abscesses, lacerations or dental caries.   Eyes: EOM are normal. Pupils are equal, round, and reactive to light.   Neck: Normal range of motion. Neck supple.   Cardiovascular: Normal rate, regular rhythm and normal heart sounds. Exam reveals no gallop and no friction rub.   No murmur heard.  Pulmonary/Chest: Effort normal and breath sounds normal. No respiratory distress. She has no wheezes. She has no rales.   Abdominal: Soft. Bowel sounds are normal. She exhibits no distension and no mass. There is generalized tenderness.  There is no guarding.   Musculoskeletal: She exhibits no edema.   Neurological: She is alert and oriented to person, place, and time.   Skin: Skin is warm and dry. She is not diaphoretic.   Psychiatric: She has a normal mood and affect. Her behavior is normal.     Diagnostics: No results found for: RPR  No results found for: CMVANTIBODIE  No results found for: HIV1X2  No results found for: HTLVIIIANTIB  No results found for: HEPBSAG  Hep B Core Total Ab   Date Value Ref Range Status   08/27/2018 Negative  Final     Hepatitis C Ab   Date Value Ref Range Status   08/27/2018 Negative  Final     No results found for: TOXOIGG  No components found for: TOXOIGGINTER  No results found for: CRM9DGD  No results found for: UWR4BAM  Varicella IgG   Date Value Ref Range Status   08/27/2018 2.94 (H) 0.00 - 0.90 ISR Final     Varicella Interpretation   Date Value Ref Range Status   08/27/2018 Positive (A) Negative Final     Comment:     <or=0.90     Negative        No detectable IgG antibody to Varicella zoster  by the GISELA test. Such individuals are presumed to be   uninfected with Varicella zoster and to be susceptible to   primary infection.  0.91-1.09    Equivocal  >or=1.10     Positive        Indicates presence of detectable IgG antibody to Varicella   zoster by the GISELA test. Indicative of previous or current   infection.       No results found for: STRONGANTIGG  No results found for: EPSTEINBARRV  Hep B S Ab   Date Value Ref Range Status   08/27/2018 Positive (A)  Final     No results found for: QUANTIFERON  No results found for: HEPAIGM  No results found for: PPD  No results found for this or any previous visit.     Ref. Range 6/10/2014 15:15 8/27/2018 10:44   Hep B Core Total Ab Unknown  Negative   Hep B S Ab Unknown  Positive (A)   Hepatitis C Ab Unknown  Negative   Specimen Type Unknown Blood    HIV 1/2 Ag/Ab Latest Ref Range: Negative   Negative   Varicella IgG Latest Ref Range: 0.00 - 0.90 ISR  2.94 (H)   Varicella  Interpretation Latest Ref Range: Negative   Positive (A)   Results for ARTIE MELTON (MRN [id number]) as of 9/14/2018 16:51   Ref. Range 8/27/2018 10:44   Hepatitis A Antibody IgG Unknown Positive (A)         BRM - Candidacy    Biologic Response Modifier Candidacy: Based on available information, there are no identified significant barriers to BRMs from an infectious disease standpoint pending acceptable serologies.  Final determination of BRM candidacy will be made once evaluation is complete and reviewed.    YAQUELIN Avina  Vaccine and Serology Needs:  1.  Prevnar today  2. Pneumovax in 2 months  3. Mother will confirm patient had Tdap recently and will notify me if not.      Counseling:I discussed with Nova PANG the risk for increased susceptibility to infections following BRM therapy including increased risk for infection.  The patients has been counseled on the importance of vaccinations including but not limited to a yearly flu vaccine.Specific guidance has been provided to the patient regarding the patients occupation, hobbies and activities to avoid future infectious complications including but not limited to avoiding undercooked meats and seafood, proper hygiene, and contact with animals.

## 2018-09-13 ENCOUNTER — TELEPHONE (OUTPATIENT)
Dept: GASTROENTEROLOGY | Facility: CLINIC | Age: 23
End: 2018-09-13

## 2018-09-13 NOTE — TELEPHONE ENCOUNTER
----- Message from Patti Mckinley sent at 9/13/2018  3:04 PM CDT -----  Contact: Mother- Torie- [phone]  Collazo- pts mother called with questions regarding the pts upcoming c-scope- please contact Torie at [phone]

## 2018-09-13 NOTE — TELEPHONE ENCOUNTER
Spoke to pt's mother, informed her that we need colon pictures from last colonoscopy.    We also discussed the need for a Colonoscopy & VCE.    Pt's mother verbalized understanding.

## 2018-09-14 LAB — ADALIMUMAB CONCENTRATION & ANTI-ADALIMUMAB ANTIBODY: NORMAL

## 2018-09-14 NOTE — TELEPHONE ENCOUNTER
Pt will be rescheduling 9/18/2018 Colonoscopy, due to a school schedule conflict.    Pt's mother will call GI MD, today, to request color copies of last Colonoscopy.

## 2018-09-18 ENCOUNTER — ANESTHESIA EVENT (OUTPATIENT)
Dept: ENDOSCOPY | Facility: HOSPITAL | Age: 23
End: 2018-09-18
Payer: COMMERCIAL

## 2018-09-18 ENCOUNTER — ANESTHESIA (OUTPATIENT)
Dept: ENDOSCOPY | Facility: HOSPITAL | Age: 23
End: 2018-09-18
Payer: COMMERCIAL

## 2018-09-18 ENCOUNTER — HOSPITAL ENCOUNTER (OUTPATIENT)
Facility: HOSPITAL | Age: 23
Discharge: HOME | End: 2018-09-18
Attending: INTERNAL MEDICINE | Admitting: INTERNAL MEDICINE
Payer: COMMERCIAL

## 2018-09-18 VITALS
WEIGHT: 87 LBS | OXYGEN SATURATION: 100 % | TEMPERATURE: 98 F | SYSTOLIC BLOOD PRESSURE: 98 MMHG | HEART RATE: 80 BPM | HEIGHT: 61 IN | BODY MASS INDEX: 16.42 KG/M2 | RESPIRATION RATE: 17 BRPM | DIASTOLIC BLOOD PRESSURE: 55 MMHG

## 2018-09-18 DIAGNOSIS — K50.80 CROHN'S DISEASE OF BOTH SMALL AND LARGE INTESTINE: ICD-10-CM

## 2018-09-18 DIAGNOSIS — R19.7 DIARRHEA, UNSPECIFIED TYPE: ICD-10-CM

## 2018-09-18 DIAGNOSIS — K50.819 CROHN'S DISEASE OF SMALL AND LARGE INTESTINES WITH COMPLICATION: Primary | ICD-10-CM

## 2018-09-18 DIAGNOSIS — K50.118 CROHN'S DISEASE OF LARGE INTESTINE WITH OTHER COMPLICATION: Primary | ICD-10-CM

## 2018-09-18 LAB
B-HCG UR QL: NEGATIVE
CALPROTECTIN STL-MCNT: >2000 MCG/G
CTP QC/QA: YES

## 2018-09-18 PROCEDURE — 25000003 PHARM REV CODE 250: Performed by: NURSE ANESTHETIST, CERTIFIED REGISTERED

## 2018-09-18 PROCEDURE — 81025 URINE PREGNANCY TEST: CPT | Performed by: INTERNAL MEDICINE

## 2018-09-18 PROCEDURE — 45380 COLONOSCOPY AND BIOPSY: CPT | Mod: ,,, | Performed by: INTERNAL MEDICINE

## 2018-09-18 PROCEDURE — 45380 COLONOSCOPY AND BIOPSY: CPT | Performed by: INTERNAL MEDICINE

## 2018-09-18 PROCEDURE — 88305 TISSUE EXAM BY PATHOLOGIST: CPT | Performed by: PATHOLOGY

## 2018-09-18 PROCEDURE — 37000008 HC ANESTHESIA 1ST 15 MINUTES: Performed by: INTERNAL MEDICINE

## 2018-09-18 PROCEDURE — 27201012 HC FORCEPS, HOT/COLD, DISP: Performed by: INTERNAL MEDICINE

## 2018-09-18 PROCEDURE — 37000009 HC ANESTHESIA EA ADD 15 MINS: Performed by: INTERNAL MEDICINE

## 2018-09-18 PROCEDURE — 63600175 PHARM REV CODE 636 W HCPCS: Performed by: NURSE ANESTHETIST, CERTIFIED REGISTERED

## 2018-09-18 PROCEDURE — E9220 PRA ENDO ANESTHESIA: HCPCS | Mod: ,,, | Performed by: NURSE ANESTHETIST, CERTIFIED REGISTERED

## 2018-09-18 PROCEDURE — 88305 TISSUE EXAM BY PATHOLOGIST: CPT | Mod: 26,,, | Performed by: PATHOLOGY

## 2018-09-18 RX ORDER — PROPOFOL 10 MG/ML
VIAL (ML) INTRAVENOUS
Status: DISCONTINUED | OUTPATIENT
Start: 2018-09-18 | End: 2018-09-18

## 2018-09-18 RX ORDER — BUDESONIDE 9 MG/1
9 TABLET, FILM COATED, EXTENDED RELEASE ORAL DAILY
Qty: 30 EACH | Refills: 2 | Status: SHIPPED | OUTPATIENT
Start: 2018-09-18 | End: 2018-09-21

## 2018-09-18 RX ORDER — LIDOCAINE HCL/PF 100 MG/5ML
SYRINGE (ML) INTRAVENOUS
Status: DISCONTINUED | OUTPATIENT
Start: 2018-09-18 | End: 2018-09-18

## 2018-09-18 RX ORDER — PROPOFOL 10 MG/ML
VIAL (ML) INTRAVENOUS CONTINUOUS PRN
Status: DISCONTINUED | OUTPATIENT
Start: 2018-09-18 | End: 2018-09-18

## 2018-09-18 RX ORDER — SODIUM CHLORIDE 9 MG/ML
INJECTION, SOLUTION INTRAVENOUS CONTINUOUS PRN
Status: DISCONTINUED | OUTPATIENT
Start: 2018-09-18 | End: 2018-09-18

## 2018-09-18 RX ADMIN — LIDOCAINE HYDROCHLORIDE 25 MG: 20 INJECTION, SOLUTION INTRAVENOUS at 09:09

## 2018-09-18 RX ADMIN — PROPOFOL 150 MCG/KG/MIN: 10 INJECTION, EMULSION INTRAVENOUS at 09:09

## 2018-09-18 RX ADMIN — SODIUM CHLORIDE: 0.9 INJECTION, SOLUTION INTRAVENOUS at 09:09

## 2018-09-18 RX ADMIN — PROPOFOL 50 MG: 10 INJECTION, EMULSION INTRAVENOUS at 09:09

## 2018-09-18 NOTE — H&P
Short Stay Endoscopy History and Physical    PCP - Rao Ryder MD  Referring Physician - Praful Collazo MD  [address] [place] [address]    Procedure - Colonoscopy  ASA - per anesthesia  Mallampati - per anesthesia  History of Anesthesia problems - no  Family history Anesthesia problems -  no   Plan of anesthesia - General    HPI  23 y.o. female  Reason for procedure: Ileocolonic Crohn's disease    ROS:  Constitutional: No fevers, chills, No weight loss  CV: No chest pain  Pulm: No cough, No shortness of breath  GI: see HPI    Medical History:  has a past medical history of Crohn's disease.    Surgical History:  has no past surgical history on file.    Family History: family history includes Celiac disease in her cousin; Crohn's disease in her cousin; Other in her other.. Otherwise no colon cancer, inflammatory bowel disease, or GI malignancies.    Social History:  reports that  has never smoked. she has never used smokeless tobacco. She reports that she does not drink alcohol or use drugs.    Review of patient's allergies indicates:   Allergen Reactions    Azathioprine      Shortness of breath    Sulfa (sulfonamide antibiotics)      Chest pain and difficulty breathing       Medications:   Medications Prior to Admission   Medication Sig Dispense Refill Last Dose    calcium carbonate (OS-YUMIKO) 600 mg calcium (1,500 mg) Tab Take 1,200 mg by mouth once.   9/17/2018 at Unknown time    mesalamine (APRISO) 0.375 gram Cp24 Take 1.5 g by mouth once daily.   9/17/2018 at Unknown time    VITAMIN D3 1,000 unit capsule Take 2,000 Units by mouth once daily.  5 9/17/2018 at Unknown time    adalimumab (HUMIRA PEN) 40 mg/0.8 mL PnKt Inject 40 mg into the skin every 14 (fourteen) days. 2 each 6 9/8/2018    calcium carbonate-vit D3-min (CALCIUM-VITAMIN D) 600 mg calcium- 400 unit Tab Take 1 tablet by mouth once daily. 30 tablet 11 Not Taking    sodium,potassium,mag sulfates 17.5-3.13-1.6 gram SolR  Take 180 mLs by mouth as needed. 1 Bottle 0 Taking       Physical Exam:    Vital Signs:   Vitals:    09/18/18 0856   BP: 107/76   Pulse: 94   Resp: 16   Temp: 98.4 °F (36.9 °C)       General Appearance: Well appearing in no acute distress  Lungs: CTA anteriorly  Heart:  Regular rate, S1, S2 normal, no murmurs heard.  Abdomen: Soft, non tender, non distended with normal bowel sounds, no masses  Extremities: No edema    Labs:  Lab Results   Component Value Date    WBC 6.22 08/27/2018    HGB 13.5 08/27/2018    HCT 40.4 08/27/2018     (H) 08/27/2018    ALT 38 08/27/2018    AST 31 08/27/2018     08/27/2018    K 3.5 08/27/2018     08/27/2018    CREATININE 0.7 08/27/2018    BUN 9 08/27/2018    CO2 27 08/27/2018    TSH 0.893 08/27/2018       I have explained the risks and benefits of this endoscopic procedure to the patient including but not limited to bleeding, inflammation, infection, perforation, and death.      Praful Collazo MD

## 2018-09-18 NOTE — TRANSFER OF CARE
"Anesthesia Transfer of Care Note    Patient: Nova Johnson    Procedure(s) Performed: Procedure(s) (LRB):  COLONOSCOPY (N/A)    Patient location: GI    Anesthesia Type: general    Transport from OR: Transported from OR on room air with adequate spontaneous ventilation    Post pain: adequate analgesia    Post assessment: no apparent anesthetic complications    Post vital signs: stable    Level of consciousness: awake    Nausea/Vomiting: no nausea/vomiting    Complications: none    Transfer of care protocol was followed      Last vitals:   Visit Vitals  /76 (BP Location: Left arm, Patient Position: Lying)   Pulse 94   Temp 36.9 °C (98.4 °F) (Temporal)   Resp 16   Ht 5' 1" (1.549 m)   Wt 39.5 kg (87 lb)   LMP 09/02/2018 (Approximate)   SpO2 97%   Breastfeeding? No   BMI 16.44 kg/m²     "

## 2018-09-18 NOTE — ANESTHESIA PREPROCEDURE EVALUATION
09/18/2018  Nova Johnson is a 23 y.o., female.  Past Medical History:   Diagnosis Date    Crohn's disease      History reviewed. No pertinent surgical history.      Anesthesia Evaluation    I have reviewed the Patient Summary Reports.     I have reviewed the Medications.     Review of Systems  Anesthesia Hx:  Denies Hx of Anesthetic complications  Neg history of prior surgery. Denies Family Hx of Anesthesia complications.   Denies Personal Hx of Anesthesia complications.   Cardiovascular:   Exercise tolerance: good        Physical Exam  General:  Well nourished    Airway/Jaw/Neck:  Airway Findings: Mouth Opening: Normal Tongue: Normal  General Airway Assessment: Adult  Mallampati: II  TM Distance: Normal, at least 6 cm         Dental:  DENTAL FINDINGS: Normal   Chest/Lungs:  Chest/Lungs Clear    Heart/Vascular:  Heart Findings: Normal       Mental Status:  Mental Status Findings:  Alert and Oriented         Anesthesia Plan  Type of Anesthesia, risks & benefits discussed:  Anesthesia Type:  general  Patient's Preference:   Intra-op Monitoring Plan: standard ASA monitors  Intra-op Monitoring Plan Comments:   Post Op Pain Control Plan:   Post Op Pain Control Plan Comments:   Induction:   IV  Beta Blocker:  Patient is not currently on a Beta-Blocker (No further documentation required).       Informed Consent: Patient understands risks and agrees with Anesthesia plan.  Questions answered. Anesthesia consent signed with patient.  ASA Score: 2     Day of Surgery Review of History & Physical:    H&P update referred to the provider.  H&P completed by Anesthesiologist.       Ready For Surgery From Anesthesia Perspective.

## 2018-09-18 NOTE — ANESTHESIA POSTPROCEDURE EVALUATION
"Anesthesia Post Evaluation    Patient: Nova Johnson    Procedure(s) Performed: Procedure(s) (LRB):  COLONOSCOPY (N/A)    Final Anesthesia Type: general  Patient location during evaluation: PACU  Patient participation: Yes- Able to Participate  Level of consciousness: awake and alert and oriented  Post-procedure vital signs: reviewed and stable  Pain management: adequate  Airway patency: patent  PONV status at discharge: No PONV  Anesthetic complications: no      Cardiovascular status: blood pressure returned to baseline  Respiratory status: unassisted, room air and spontaneous ventilation  Hydration status: euvolemic  Follow-up not needed.        Visit Vitals  /70   Pulse 80   Temp 36.6 °C (97.9 °F)   Resp 18   Ht 5' 1" (1.549 m)   Wt 39.5 kg (87 lb)   LMP 09/02/2018 (Approximate)   SpO2 99%   Breastfeeding? No   BMI 16.44 kg/m²       Pain/Rowan Score: Pain Assessment Performed: Yes (9/18/2018 10:17 AM)  Presence of Pain: denies (9/18/2018 10:17 AM)  Pain Rating Prior to Med Admin: 0 (9/18/2018  8:50 AM)  Rowan Score: 10 (9/18/2018 10:16 AM)        "

## 2018-09-18 NOTE — TELEPHONE ENCOUNTER
Spoke to mother and patient in waiting area, pt scheduled for Colonoscopy, today.    Received color copy of 8/1/2018 Colonoscopy. Given to MD.    Immunization record sent to be scanned into Media.

## 2018-09-18 NOTE — DISCHARGE INSTRUCTIONS
Colonoscopy     A camera attached to a flexible tube with a viewing lens is used to take video pictures.     Colonoscopy is a test to view the inside of your lower digestive tract (colon and rectum). Sometimes it can show the last part of the small intestine (ileum). During the test, small pieces of tissue may be removed for testing. This is called a biopsy. Small growths, such as polyps, may also be removed.   Why is colonoscopy done?  The test is done to help look for colon cancer. And it can help find the source of abdominal pain, bleeding, and changes in bowel habits. It may be needed once a year, depending on factors such as your:  · Age  · Health history  · Family health history  · Symptoms  · Results from any prior colonoscopy  Risks and possible complications  These include:  · Bleeding               · A puncture or tear in the colon   · Risks of anesthesia  · A cancer lesion not being seen  Getting ready   To prepare for the test:  · Talk with your healthcare provider about the risks of the test (see below). Also ask your healthcare provider about alternatives to the test.  · Tell your healthcare provider about any medicines you take. Also tell him or her about any health conditions you may have.  · Make sure your rectum and colon are empty for the test. Follow the diet and bowel prep instructions exactly. If you dont, the test may need to be rescheduled.  · Plan for a friend or family member to drive you home after the test.     Colonoscopy provides an inside view of the entire colon.     You may discuss the results with your doctor right away or at a future visit.  During the test   The test is usually done in the hospital on an outpatient basis. This means you go home the same day. The procedure takes about 30 minutes. During that time:  · You are given relaxing (sedating) medicine through an IV line. You may be drowsy, or fully asleep.  · The healthcare provider will first give you a physical exam to  check for anal and rectal problems.  · Then the anus is lubricated and the scope inserted.  · If you are awake, you may have a feeling similar to needing to have a bowel movement. You may also feel pressure as air is pumped into the colon. Its OK to pass gas during the procedure.  · Biopsy, polyp removal, or other treatments may be done during the test.  After the test   You may have gas right after the test. It can help to try to pass it to help prevent later bloating. Your healthcare provider may discuss the results with you right away. Or you may need to schedule a follow-up visit to talk about the results. After the test, you can go back to your normal eating and other activities. You may be tired from the sedation and need to rest for a few hours.  Date Last Reviewed: 11/1/2016 © 2000-2017 [employer]. [address] [place] [address], [place] [address]. All rights reserved. This information is not intended as a substitute for professional medical care. Always follow your healthcare professional's instructions.

## 2018-09-18 NOTE — PROVATION PATIENT INSTRUCTIONS
Discharge Summary/Instructions after an Endoscopic Procedure  Patient Name: Nova Johnson  Patient MRN: [id number]  Patient YOB: 1995 Tuesday, September 18, 2018  Praful Collazo MD  RESTRICTIONS:  During your procedure today, you received medications for sedation.  These   medications may affect your judgment, balance and coordination.  Therefore,   for 24 hours, you have the following restrictions:   - DO NOT drive a car, operate machinery, make legal/financial decisions,   sign important papers or drink alcohol.    ACTIVITY:  Today: no heavy lifting, straining or running due to procedural   sedation/anesthesia.  The following day: return to full activity including work.  DIET:  Eat and drink normally unless instructed otherwise.     TREATMENT FOR COMMON SIDE EFFECTS:  - Mild abdominal pain, nausea, belching, bloating or excessive gas:  rest,   eat lightly and use a heating pad.  - Sore Throat: treat with throat lozenges and/or gargle with warm salt   water.  - Because air was used during the procedure, expelling large amounts of air   from your rectum or belching is normal.  - If a bowel prep was taken, you may not have a bowel movement for 1-3 days.    This is normal.  SYMPTOMS TO WATCH FOR AND REPORT TO YOUR PHYSICIAN:  1. Abdominal pain or bloating, other than gas cramps.  2. Chest pain.  3. Back pain.  4. Signs of infection such as: chills or fever occurring within 24 hours   after the procedure.  5. Rectal bleeding, which would show as bright red, maroon, or black stools.   (A tablespoon of blood from the rectum is not serious, especially if   hemorrhoids are present.)  6. Vomiting.  7. Weakness or dizziness.  GO DIRECTLY TO THE NEAREST EMERGENCY ROOM IF YOU HAVE ANY OF THE FOLLOWING:      Difficulty breathing              Chills and/or fever over 101 F   Persistent vomiting and/or vomiting blood   Severe abdominal pain   Severe chest pain   Black, tarry stools   Bleeding- more than one  tablespoon   Any other symptom or condition that you feel may need urgent attention  Your doctor recommends these additional instructions:  If any biopsies were taken, your doctors clinic will contact you in 1 to 2   weeks with any results.  - Discharge patient to home.   - Patient has a contact number available for emergencies.  The signs and   symptoms of potential delayed complications were discussed with the   patient.  Return to normal activities tomorrow.  Written discharge   instructions were provided to the patient.   - Resume previous diet.   - Continue present medications.   - Await pathology results.   - Return to referring physician.   - Repeat colonoscopy (date not yet determined) to assess disease activity.  For questions, problems or results please call your physician - Praful Collazo MD at Work:  [phone].  [facility], EMERGENCY ROOM PHONE NUMBER: [phone]  IF A COMPLICATION OR EMERGENCY SITUATION ARISES AND YOU ARE UNABLE TO REACH   YOUR PHYSICIAN - GO DIRECTLY TO THE EMERGENCY ROOM.  Praful Collazo MD  9/18/2018 9:57:15 AM  This report has been verified and signed electronically.  [employer]

## 2018-09-20 ENCOUNTER — TELEPHONE (OUTPATIENT)
Dept: PHARMACY | Facility: CLINIC | Age: 23
End: 2018-09-20

## 2018-09-20 DIAGNOSIS — K50.118 CROHN'S DISEASE OF LARGE INTESTINE WITH OTHER COMPLICATION: Primary | ICD-10-CM

## 2018-09-21 ENCOUNTER — PATIENT MESSAGE (OUTPATIENT)
Dept: GASTROENTEROLOGY | Facility: CLINIC | Age: 23
End: 2018-09-21

## 2018-09-21 ENCOUNTER — TELEPHONE (OUTPATIENT)
Dept: GASTROENTEROLOGY | Facility: CLINIC | Age: 23
End: 2018-09-21

## 2018-09-21 RX ORDER — BUDESONIDE 3 MG/1
9 CAPSULE, COATED PELLETS ORAL DAILY
Qty: 90 CAPSULE | Refills: 1 | Status: SHIPPED | OUTPATIENT
Start: 2018-09-21 | End: 2018-10-08 | Stop reason: ALTCHOICE

## 2018-09-21 NOTE — TELEPHONE ENCOUNTER
Dr. AARON Collazo called pt and discusses benefits of Entocort as compared to risks of prednisone.    Awaiting decision of pt as to which medication she will be taking.

## 2018-09-21 NOTE — TELEPHONE ENCOUNTER
Left message informing pt that I would be sending her a my [facility] portal message, to please read this message.    [phone] call back number provided.

## 2018-09-21 NOTE — TELEPHONE ENCOUNTER
----- Message from Alisa Tesfaye sent at 9/21/2018  4:47 PM CDT -----  Contact: yuliet burnette - [phone]  Zay - calling about her new rx - entecort - is asking for the strength - please call yuliet burnette - [phone]

## 2018-09-24 ENCOUNTER — PATIENT MESSAGE (OUTPATIENT)
Dept: GASTROENTEROLOGY | Facility: CLINIC | Age: 23
End: 2018-09-24

## 2018-09-25 ENCOUNTER — TELEPHONE (OUTPATIENT)
Dept: ENDOSCOPY | Facility: HOSPITAL | Age: 23
End: 2018-09-25

## 2018-09-28 ENCOUNTER — LAB VISIT (OUTPATIENT)
Dept: LAB | Facility: HOSPITAL | Age: 23
End: 2018-09-28
Attending: FAMILY MEDICINE
Payer: COMMERCIAL

## 2018-09-28 DIAGNOSIS — K50.118 CROHN'S DISEASE OF LARGE INTESTINE WITH OTHER COMPLICATION: ICD-10-CM

## 2018-09-28 DIAGNOSIS — R19.7 DIARRHEA, UNSPECIFIED TYPE: ICD-10-CM

## 2018-09-28 LAB
C DIFF GDH STL QL: NEGATIVE
C DIFF TOX A+B STL QL IA: NEGATIVE

## 2018-09-28 PROCEDURE — 87045 FECES CULTURE AEROBIC BACT: CPT

## 2018-09-28 PROCEDURE — 87324 CLOSTRIDIUM AG IA: CPT

## 2018-09-28 PROCEDURE — 87046 STOOL CULTR AEROBIC BACT EA: CPT | Mod: 59

## 2018-09-28 PROCEDURE — 87427 SHIGA-LIKE TOXIN AG IA: CPT | Mod: 59

## 2018-09-30 LAB
E COLI SXT1 STL QL IA: NEGATIVE
E COLI SXT2 STL QL IA: NEGATIVE

## 2018-10-01 LAB — BACTERIA STL CULT: NORMAL

## 2018-10-08 ENCOUNTER — OFFICE VISIT (OUTPATIENT)
Dept: GASTROENTEROLOGY | Facility: CLINIC | Age: 23
End: 2018-10-08
Payer: COMMERCIAL

## 2018-10-08 VITALS
BODY MASS INDEX: 17.03 KG/M2 | SYSTOLIC BLOOD PRESSURE: 107 MMHG | HEART RATE: 101 BPM | WEIGHT: 90.19 LBS | HEIGHT: 61 IN | DIASTOLIC BLOOD PRESSURE: 72 MMHG | TEMPERATURE: 98 F

## 2018-10-08 DIAGNOSIS — K50.80 CROHN'S DISEASE OF BOTH SMALL AND LARGE INTESTINE WITHOUT COMPLICATION: Primary | ICD-10-CM

## 2018-10-08 DIAGNOSIS — E55.9 VITAMIN D DEFICIENCY: ICD-10-CM

## 2018-10-08 DIAGNOSIS — K58.0 IRRITABLE BOWEL SYNDROME WITH DIARRHEA: ICD-10-CM

## 2018-10-08 DIAGNOSIS — Z79.899 LONG TERM CURRENT USE OF IMMUNOSUPPRESSIVE DRUG: ICD-10-CM

## 2018-10-08 PROCEDURE — 99999 PR PBB SHADOW E&M-EST. PATIENT-LVL III: CPT | Mod: PBBFAC,,, | Performed by: INTERNAL MEDICINE

## 2018-10-08 PROCEDURE — 99215 OFFICE O/P EST HI 40 MIN: CPT | Mod: S$GLB,,, | Performed by: INTERNAL MEDICINE

## 2018-10-08 PROCEDURE — 3008F BODY MASS INDEX DOCD: CPT | Mod: CPTII,S$GLB,, | Performed by: INTERNAL MEDICINE

## 2018-10-08 NOTE — PROGRESS NOTES
[facility]             Inflammatory Bowel Disease Follow-up  Note              TODAY'S VISIT DATE:  10/8/2018    Chief Complaint:   Chief Complaint   Patient presents with    Crohn's Disease     PCP: Rao Ryder    Previous History:  Nova Johnson is a 23 y.o. female with ileal (microscopic inflammation) and pancolonic (worse on right Crohn's Disease.  She began with symptoms of nausea and intermittent abdominal pain in 2007 that progressed into bloody diarrhea and poor weight gain by 2/2008.  She had a normal SBFT on 2/28/2008.  EGD on 3/3/2018 was normal and biopsies showed normal duodenum, chronic inactive H. Pylori negative gastritis, and esophageal biopsies showed inflammation consistent with reflux.  Colonoscopy on 3/3/2018 (only advanced to sigmoid colon) showed erythema and loss of vascularity from the rectum to the sigmoid colon with biopsies positive for chronic active colitis.  She was initially started on prednisone for induction then was started on asacol 1.2 gm/day and eventually tapered off of prednisone within 6-8 weeks.  She had a repeat colonoscopy in 8/2008 due to ongoing symptoms which showed continued inflammation from the sigmoid colon to the cecum.  Due to these findings, she was given another course of prednisone and started on imuran.  Unfortunately, after 1 week of imuran she developed CP and SOB so this was discontinued.  Asacol was switched to Lialda 1.2 gm/day (unsure of why this switch was made).  She was then started on remicade 5 mg/kg every 8 weeks in 10/2008 and responded well.  From [id number] she was being followed at [facility] and per mother she remained in clinical remission with normal labs (we do not have these records).  Sometime during 2011, her remicade frequency was decreased to 5 mg/kg every 6 weeks due to having symptoms a few weeks prior to her next dose which helped.  Lialda was discontinued in 6/2011.  She had another  colonoscopy in 6/2011 that again showed active disease.  She established care with Dr. Urbano in 8/2011 and had undectectable IFX levels with ABs so remicade was discontinued with her last dose approximately 6/1/2011.  She was started on Humira in 8/2011 with induction dosing followed by maintenance dosing of 40 mg SC every 2 weeks.  She then transitioned to adult GI care and established care with Dr. Narayanan and continued humira 40 mg SC every 2 weeks.  Colonoscopy 1/2016 showed continued active colitis and focal mild ileitis on biopsies and she continued on humira 40 mg SC every 2 weeks.  Trough humira levels and biopsies were drawn on 3/19/2017 which showed a level of 14 with no ABs.  She continued on humira 40 mg SC every 14 days and was also started on entocort 9 mg PO daily due to some active symptoms and tapered off of entocort by 7/2017.  Apriso was then added to her treatment regimen sometime in 2017.  She had another colonoscopy in 8/2018 that looked normal endoscopically though biopsies showed moderate active colitis throughout and mild active enteritis.  She saw me in the IBD clinic for a 2nd opinion at 8/27/18 at which time I proceeded with restaging her disease with MRE, colonoscopy, stool studies and labs. She continues on  humira 40 mg SC every 14 days and apriso 1.5 gm/day.     Interval History:  - current IBD meds: Humira 40 mg Sc every other week (last dose 9/23/18, next dose was supposed to be yesterday but wasn't able to so will do today)  - 2 weeks ago she started with diarrhea and this has since resolved and she remarks that she has good days and bad days.   - 2 BMs/day of variable consistency from loose to formed, no blood   - 8/31/18 MRE: normal  - 9/7/18 trough humira levels/abs:  11/negative Abs  - 9/18/18 colonoscopy: Mild patchy colitis that is more apparent (still mild) in the right colon and cecum. The examined portion of the ileum was normal.   - constitutional/GI symptoms: no  fevers/chills, weight loss, dysphagia, GERD, abdominal pain  - extraintestinal manifestations: no eye pain/redness, skin lesions/rashes, liver problems, joint pain/swelling/stiffness    Pertinent Endoscopy/Imagin2008 SBFT: no significant abnormality, no indication of IBD  3/3/2008 EGD: normal (path-duodenum: normal) (path-stomach: chronic non-active, non-atrophic H. pylori negative gastritis with reactive surface change) (path-esophagus: minimal chronic inflammation and mild features of reflux)  3/3/2008 Colonoscopy: very erythematous and loss of vascularity from rectum to sigmoid colon--scope not advanced beyond this point (path-colon and rectum: chronic active colitis)  2008 EGD: mild erythema in the duodenum (path: normal); normal stomach (path: chronic H. pylori negative gastritis) and esophagus (path: normal)  2008 Colonoscopy: erythema and loss of vascularity in the sigmoid colon extending proximally to the cecum (path-cecum, transverse, descending, sigmoid: acute colitis with cryptitis and crypt abscess); rectum appeared normal (path: acute colitis with cryptitis)  2008 KUB: normal  2011 EGD: normal esophagus (path: minimal chronic inflammation), normal examined duodenum (path: normal); gastritis (path: mild chronic non-active H. pylori negative gastritis)  2011 Colonoscopy: colon appeared normal (Path-colon and rectum: chronic colitis with very rare foci of activity), no mention of terminal ileum  2012 abdominal xray 1 view: normal  2016 Colonoscopy: entire examined colon (path-ascending, descending: mild active colitis); terminal ileum appeared normal (path: focal mild ileitis); 3 mm polyp in the rectum-removed (path: mild active proctitis)  2018 Colonoscopy: normal examined colon (path-cecum, ascending, transverse, descending, sigmoid, rectum: moderate chronic active colitis with granulomas); terminal ileum appeared normal (path-mild chronic active  enteritis)  8/31/18 MRE: normal  9/18/18 colonoscopy: Mild patchy colitis that is more apparent (still mild) in the right colon and cecum. The examined portion of the ileum was normal.     Pertinent Labs:  3/21/2018: Vitamin D 22 (), B12 673, folate 20.3, Ferritin 39, HBsAg Non-Reactive, WBC 7.8, RBC 4.0, Hgb 12.7, Hct 37.5, MCV 93.8, platelets 347, BUN 10, creatinine 10, albumin 4.6, total bili 0.4, alk phos 71, AST 18, ALT 18  4/23/2018: TB Quantiferon Negative   Lab Results   Component Value Date    SEDRATE 42 (H) 08/27/2018    CRP 0.1 08/27/2018     Lab Results   Component Value Date    TTGIGA 16 08/27/2018     (H) 08/27/2018     Lab Results   Component Value Date    TSH 0.893 08/27/2018    FREET4 1.07 08/27/2018     Lab Results   Component Value Date    ZEDHWNPU39TD 29 (L) 11/06/2012     Lab Results   Component Value Date    HEPBCAB Negative 08/27/2018    HEPCAB Negative 08/27/2018     Lab Results   Component Value Date    OMN57GAXS Negative 08/27/2018     No results found for: NIL, TBAG, TBAGNIL, MITOGENNIL, TBGOLD, TSPOTSCREN  No results found for: TPTMINTERP, TPMTRESULT  Lab Results   Component Value Date    STOOLCULTURE  09/28/2018     No Salmonella,Shigella,Vibrio,Campylobacter,Yersinia isolated.    FPWOQPBMHS1K Negative 09/28/2018    UZZJHHDDFC1P Negative 09/28/2018    CDIFFICILEAN Negative 09/28/2018    CDIFFTOX Negative 09/28/2018     Lab Results   Component Value Date    CALPROTECTIN >2000.0 (H) 09/07/2018     Therapeutic Drug Monitoring Labs:  3/19/2017: [facility]oter[facility] Humira Levels 14, Abs < 25  9/7/18 trough [facility] humira levels/abs:  11/negative Abs    Prior IBD Therapies:  Prednisone- effective  Entocort- effective  Asacol 1.2 gm/day- ineffective  Imuran-respiratory problems  Remicade 8/2008-stopped 6/2011- lost reponse with undetectable levels  Lialda  MTX 15 mg PO weekly--fatigue, nausea    Current IBD Therapies:  Humira 40 mg SC every other week (started 8/2011)  Apriso 1.5  g/day    Vaccinations:  Influenza (inactive):  Not had due to past SE  Pneumococcal PCV 13: recommended  Pneumococcal PCV 23: recommended   Tetanus (TdaP): approximately 2015  HPV (males and females ages 19-25 yo):   Had this in highschool   Meningococcal (risk factors- complement component deficiency, spleen damage or splenectomy, HIV, traveling to endemic areas, college student residing in residence [place],  recruits):  Not sure  Hepatitis B:  immune  Lab Results   Component Value Date    HEPBSAB Positive (A) 08/27/2018     Hepatitis A (risk factors- traveling to high endemic areas, chronic liver disease, clotting factor disorders, MSM, illicit drug users):   immune  Lab Results   Component Value Date    HEPAIGG Positive (A) 08/27/2018     MMR (live vaccine):   Not sure  Chickenpox status/Varicella (live vaccine): immune  Lab Results   Component Value Date    VARICELLAZOS 2.94 (H) 08/27/2018    VARICELLAINT Positive (A) 08/27/2018   Shingrix- recommended when available    NSAID use/indication:  None    Narcotic use:  None    Alternative/Complementary Meds for IBD:  None    Review of Systems   Constitutional: Negative for chills, fever and weight loss.   HENT:        No oral ulcers, dysphagia, oral thrush   Eyes: Negative for blurred vision, pain and redness.   Respiratory: Negative for cough and shortness of breath.    Cardiovascular: Negative for chest pain.   Gastrointestinal: Positive for abdominal pain and nausea. Negative for heartburn and vomiting.   Genitourinary: Negative for dysuria and hematuria.   Musculoskeletal: Negative for back pain and joint pain.   Skin: Negative for rash.   Psychiatric/Behavioral: Negative for depression. The patient is not nervous/anxious and does not have insomnia.      All Medical History/Surgical History/Family History/Social History/Allergies have been reviewed and updated in EMR    Review of patient's allergies indicates:   Allergen Reactions    Azathioprine       Shortness of breath    Sulfa (sulfonamide antibiotics)      Chest pain and difficulty breathing       Outpatient Medications Marked as Taking for the 10/8/18 encounter (Office Visit) with Praful Collazo MD   Medication Sig Dispense Refill    adalimumab (HUMIRA PEN) 40 mg/0.8 mL PnKt Inject 40 mg into the skin every 14 (fourteen) days. 2 each 6    calcium carbonate (OS-YUMIKO) 600 mg calcium (1,500 mg) Tab Take 1,200 mg by mouth once.      mesalamine (APRISO) 0.375 gram Cp24 Take 1.125 g by mouth once daily.       VITAMIN D3 1,000 unit capsule Take 2,000 Units by mouth once daily.  5       Vital Signs:  BP: 107/72 Temp: 98.3 °F (36.8 °C) Pulse: 101(O2: 98%)    Weight: 40.9 kg (90 lb 2.7 oz)    Physical Exam   Constitutional: She is oriented to person, place, and time. She appears well-developed.   HENT:   Mouth/Throat: Oropharynx is clear and moist. No oral lesions.   No oral ulcers or thrush   Eyes: Conjunctivae are normal. Pupils are equal, round, and reactive to light.   Cardiovascular: Normal rate and regular rhythm.   Pulmonary/Chest: Effort normal and breath sounds normal.   Abdominal: Soft. There is no tenderness.   Musculoskeletal:        Right lower leg: She exhibits no swelling.        Left lower leg: She exhibits no swelling.   Neurological: She is alert and oriented to person, place, and time.   Skin: No rash noted.   Psychiatric: She has a normal mood and affect.   Nursing note and vitals reviewed.    Labs:   Lab Results   Component Value Date    WBC 6.22 08/27/2018    HGB 13.5 08/27/2018    HCT 40.4 08/27/2018    MCV 95 08/27/2018     (H) 08/27/2018     Lab Results   Component Value Date    CREATININE 0.7 08/27/2018    ALBUMIN 4.0 08/27/2018    BILITOT 0.8 08/27/2018    ALKPHOS 79 08/27/2018    AST 31 08/27/2018    ALT 38 08/27/2018     No results found for: NIL, TBAG, TBAGNIL, MITOGENNIL, TBGOLD, TSPOTSCREN    Assessment/Plan:  Nova Johnson is a 23 y.o. female with ileal and colonic  Crohn's disease (microscopic ileitis on colonoscopy , granulomas 2018) who had restaging of her disease with me in 2018 with MRE normal and colonoscopy very mild colitis in the right colon but overall underimpressive. She had some diarrhea that improved on its own and says that this pain and diarrhea are intermittent. She does have some mild inflammation and stool calprotectin reflective of this but I also feel she has underlying IBS. Today we discussed that recurrent courses of prednisone are not needed and her primary GI recently prescribed this but we asked that she not take this and we reevaluate or consider uceris instead. Her symptoms subsided w/o treatment on its own.  In regards to IBS I will defer to Dr. Soni but we did discuss trying fiber supplementation first and if this doesn't help then consider immodium for diarrhea. She can try levsin SL for her rare abdominal pain and if this is ineffective she may benefit from nortriptylline. I would not switch her humira to alternate med give I do think it is effective and we could make things worse by switching when its not needed. We discussed that she can retry imuran and start at low dose 50 mg po daily and if TPMT normal (drawn today), WBC count allows then she can increase to 2.5 mg/kg/day for dual treatment.  We briefly discussed entyvio but I would be again careful about switching to alternate med.     # Ileal and colonic Crohn's disease (microscopic ileitis on colonoscopy , granulomas 2018)  - continue Humira 40 mg SC every other week  - discussed MOA, risks, route of imuran in detail- TPMT ordered today and can start with Dr Soni as above- see impression  - VCE can be considered in the future   - drug monitoring labs: TPMT (normal 2018), TB quantiferon (negative outside hospital 18), Hep B testing (HBsAg neg 3/2018 outside hospital, HBtotalcoreAb-ordered today)  - other drug monitorin18 trough (1-2 days early, dose  "9/9/18) [facility] infliximab levels/abs     # IBS/visceral hypersensitivity:  - there is a component of IBS overlapping with her Crohn's disease, symptoms come and go and out of proportion to the degree of inflammation found  - she will try citrucel or metamucil to help see if this will better regulate her bowel movements though if no improvement after 4-5 days she will stop   - immodium for diarrhea, levsin for the pain but if levsin is ineffective then nortriptylline can be considered    # IBD specific health maintenance:  Colorectal cancer risk:    Risks factors: >1/3 of colon involved with colitis and >8-10 years of IBD duration  - Distribution of colonic disease:  >1/3 of colon involved with Crohn's disease" with colitis  - Year of symptom onset: 2007  - colonoscopy:  every 1-2 years for surveillance once in endoscopic remission    Pap smear recommended yearly   Opthamologic exam recommended yearly   Dermatologic exam recommended yearly    Bone health:  Risk of osteopenia/osteoporosis:  Risks factors: consider bone density     Vitamin D: vitamin D level ordered today  Lab Results   Component Value Date    [id number] 29 (L) 11/06/2012     Vaccine counseling:  - No LIVE VACCINES   - prevnar 13 and then 2-12 mos later pneumovax 23   - flu if pt willing to have this  - shingrix when available    Follow up: prn, continue f/u with Dr. Soni    Total visit time was 40 minutes, more than 50% of which was spent in face-to-face counseling with patient regarding evaluation and management goals and treatment options for Crohn's disease and IBS    Praful Collazo MD  Department of Gastroenterology  Medical Director, Inflammatory Bowel Disease            "

## 2018-10-08 NOTE — PATIENT INSTRUCTIONS
Instructions:  - labs today  - consider immodium for diarrhea, consider levsin sublingual for cramps or nortriptylline for abdominal pain, also may consider fiber supplementation (metamucil daily or citrucel daily)  - try fiber first to see if this regulates your bowel movements and if not helpful after 5 days then stop  - continue humira 40 mg SC every other week  - get humira levels done once a year (9/2019)  - start imuran 50 mg po daily if TPMT is normal; we will get TPMT results back in about 10 days and max dose if WBC count normal, TPMT normal and weight doesn't change 100 mg po daily  - get your flu shot   - paps yearly  - eye exam yearly  - skin exam yearly including sunblock  - if diarrhea ongoing consistently then capsule endoscopy can be considered   - followup with Dr. Soni in the next 2 weeks

## 2018-10-09 ENCOUNTER — PATIENT MESSAGE (OUTPATIENT)
Dept: GASTROENTEROLOGY | Facility: CLINIC | Age: 23
End: 2018-10-09

## 2018-10-09 PROBLEM — K58.0 IRRITABLE BOWEL SYNDROME WITH DIARRHEA: Status: ACTIVE | Noted: 2018-10-09

## 2018-10-11 ENCOUNTER — OFFICE VISIT (OUTPATIENT)
Dept: PODIATRY | Facility: CLINIC | Age: 23
End: 2018-10-11
Payer: COMMERCIAL

## 2018-10-11 VITALS
BODY MASS INDEX: 16.71 KG/M2 | WEIGHT: 88.5 LBS | HEIGHT: 61 IN | DIASTOLIC BLOOD PRESSURE: 83 MMHG | SYSTOLIC BLOOD PRESSURE: 112 MMHG | HEART RATE: 96 BPM

## 2018-10-11 DIAGNOSIS — M79.675 PAIN IN TOES OF BOTH FEET: ICD-10-CM

## 2018-10-11 DIAGNOSIS — L60.1 ONYCHOLYSIS OF TOENAIL: Primary | ICD-10-CM

## 2018-10-11 DIAGNOSIS — M79.674 PAIN IN TOES OF BOTH FEET: ICD-10-CM

## 2018-10-11 DIAGNOSIS — B35.1 ONYCHOMYCOSIS DUE TO DERMATOPHYTE: ICD-10-CM

## 2018-10-11 PROCEDURE — 11720 DEBRIDE NAIL 1-5: CPT | Mod: S$GLB,,, | Performed by: PODIATRIST

## 2018-10-11 PROCEDURE — 99203 OFFICE O/P NEW LOW 30 MIN: CPT | Mod: 25,S$GLB,, | Performed by: PODIATRIST

## 2018-10-11 PROCEDURE — 3008F BODY MASS INDEX DOCD: CPT | Mod: CPTII,S$GLB,, | Performed by: PODIATRIST

## 2018-10-11 PROCEDURE — 99999 PR PBB SHADOW E&M-EST. PATIENT-LVL III: CPT | Mod: PBBFAC,,, | Performed by: PODIATRIST

## 2018-10-11 NOTE — PATIENT INSTRUCTIONS
- Apply half pea sized amount of equal parts over-the-counter antifungal and antibiotic creams such as lamisil and neosporin on both big toenails once daily everyday and cover with a bandaid.    - Continue to use over-the-counter fungoid tincture on toenails, not on skin.     - Notify clinic if new or worsening condition arises.    - Follow up in 2 months for further nail trimming and recheck fungal infection.

## 2018-10-11 NOTE — LETTER
October 19, 2018      Rao Ryder MD  [address] [facility] [address] [place] [address]           [facility]  [address] [facility] [address] [place] [address]  Phone: [phone]          Patient: Nova Johnson   MR Number: [id number]   YOB: 1995   Date of Visit: 10/11/2018       Dear Dr. Rao Ryder:    Thank you for referring Nova Johnson to me for evaluation. Attached you will find relevant portions of my assessment and plan of care.    If you have questions, please do not hesitate to call me. I look forward to following Nova Johnson along with you.    Sincerely,    Ramirez Kaye  CC:  No Recipients    If you would like to receive this communication electronically, please contact [email or web address] or [phone] to request more information on [facility] Link access.    For providers and/or their staff who would like to refer a patient to [facility], please contact us through our one-stop-shop provider referral line, Emma Simmons, at [phone].    If you feel you have received this communication in error or would no longer like to receive these types of communications, please e-mail [email or web address]

## 2018-10-21 PROBLEM — M79.675 PAIN IN TOES OF BOTH FEET: Status: ACTIVE | Noted: 2018-10-21

## 2018-10-21 PROBLEM — M79.674 PAIN IN TOES OF BOTH FEET: Status: ACTIVE | Noted: 2018-10-21

## 2018-10-21 PROBLEM — L60.1 ONYCHOLYSIS OF TOENAIL: Status: ACTIVE | Noted: 2018-10-21

## 2018-10-21 PROBLEM — B35.1 ONYCHOMYCOSIS DUE TO DERMATOPHYTE: Status: ACTIVE | Noted: 2018-10-21

## 2018-10-22 NOTE — PROGRESS NOTES
Subjective:      Patient ID: Nova Johnson is a 23 y.o. female.    Chief Complaint: Ingrown Toenail (Bilateral great toe, PCP-()-01/10/2017)    HPI:  Patient presents complaining of painful discoloration and separation of the nail from the night bed on both great toes.  She denies any known history of trauma but does remark leaving toenails too long.  She rates pain as 1/10 on the pain scale when pressure is applied.  Patient denies any other pedal complaints at this time.    Review of Systems   Constitution: Negative for chills, diaphoresis and fever.   Cardiovascular: Negative for claudication, cyanosis and leg swelling.   Respiratory: Negative for cough, shortness of breath and wheezing.    Endocrine: Negative for cold intolerance, heat intolerance, polydipsia, polyphagia and polyuria.   Hematologic/Lymphatic: Negative for bleeding problem. Does not bruise/bleed easily.   Skin: Positive for color change and nail changes. Negative for dry skin, itching, poor wound healing, rash, skin cancer, suspicious lesions and unusual hair distribution.   Musculoskeletal: Negative for arthritis, back pain, falls, gout, joint pain, joint swelling, muscle cramps, muscle weakness, myalgias and stiffness.   Gastrointestinal: Negative for change in bowel habit, constipation, diarrhea, nausea and vomiting.   Neurological: Positive for paresthesias. Negative for disturbances in coordination, dizziness, focal weakness, loss of balance, numbness and sensory change.           Objective:      Bilateral pedal exam was performed today.    Physical Exam   Constitutional: She is oriented to person, place, and time. She appears well-developed and well-nourished. No distress.   Cardiovascular:   Pulses:       Dorsalis pedis pulses are 2+ on the right side, and 2+ on the left side.        Posterior tibial pulses are 2+ on the right side, and 2+ on the left side.   Pedal pulses palpable 2/4 DP & PT Bilateral LE.  CFT  is < 3 seconds to Bilateral hallux.  Skin temperature is warm to warm proximal tibia to distal toes Bilateral LE without localized increase in calor noted.  No erythema, edema, or ecchymosis noted Bilateral foot or ankle.  Hair growth present distally Bilateral LE.     Musculoskeletal: Normal range of motion. She exhibits tenderness. She exhibits no edema or deformity.        Right foot: There is normal range of motion and no deformity.        Left foot: There is normal range of motion and no deformity.   B/L ankle and pedal joint ROM in grossly WNL..   Bilateral ankle joint dorsiflexion is unrestricted with the knee extended and flexed per Silfverskiold exam.    Muscle strength is 5/5 for all bilateral LE muscle groups tested.   Feet:   Right Foot:   Protective Sensation: 10 sites tested. 10 sites sensed.   Skin Integrity: Negative for ulcer, blister, skin breakdown, erythema, warmth, callus or dry skin.   Left Foot:   Protective Sensation: 10 sites tested. 10 sites sensed.   Skin Integrity: Negative for ulcer, blister, skin breakdown, erythema, warmth, callus or dry skin.   Neurological: She is alert and oriented to person, place, and time. She has normal strength. She displays no atrophy and normal reflexes. No sensory deficit. She exhibits normal muscle tone. Coordination and gait normal. She displays no Babinski's sign on the right side. She displays no Babinski's sign on the left side.   Reflex Scores:       Achilles reflexes are 2+ on the right side and 2+ on the left side.  Protective sensation is intact to 10/10 sites on the right foot and to 10/10 sites on the left foot via [place] monofilament.    Proprioception is Intact to the right foot and Intact to the left foot.  Vibratory sensation is Intact to the right foot and Intact to the left foot.   Light touch sensation is Intact to the right foot and Intact to the left foot.   Achilles DTR and [place] STR are Intact to bilateral lower  extremities.   Negative Babinski sign bilateral lower extremities.  Negative clonus sign bilateral lower extremities.  Mild tenderness to palpation of B/L hallux toenail.   Skin: Skin is warm, dry and intact. Capillary refill takes less than 2 seconds. No abrasion, no bruising, no burn, no ecchymosis, no laceration, no lesion, no petechiae and no rash noted. She is not diaphoretic. No cyanosis or erythema. Nails show no clubbing.   B/L hallux toenail is thickened, dystrophic, brittle, discolored, and mycotic with subungal serosanguinous crusting debris present and  from the nail bed half way through toenail with deep horizontal groove secondary from trauma.  Toenails 2-5 B/L are WNL in length and thickness.  Webspaces 1-4 B/L are clean, dry, and intact.  Skin turgor is supple.  No dry, flaky skin noted to B/L LE.  No open wounds or suspicious pigmented lesions appreciable B/L foot or ankle.     Psychiatric: She has a normal mood and affect. Her behavior is normal. Judgment and thought content normal.   Nursing note and vitals reviewed.        Assessment:       Encounter Diagnoses   Name Primary?    Onycholysis of toenail Yes    Onychomycosis due to dermatophyte     Pain in toes of both feet          Plan:       Nova PANG was seen today for ingrown toenail.    Diagnoses and all orders for this visit:    Onycholysis of toenail    Onychomycosis due to dermatophyte    Pain in toes of both feet      I counseled the patient on her conditions, their implications and medical management.    - With patient's permission, debrided B/L hallux toenails in length and thickness via nail nippers and electrical  to patient's tolerance without incident.    - Applied antibiotic cream and instructed patient to keep nail borders softened with antibiotic cream to try to prevent recurrence of ingrowns and covered with bandaids.    Patient was given the following recommendations and instructions:  Patient Instructions   -  Apply half pea sized amount of equal parts over-the-counter antifungal and antibiotic creams such as lamisil and neosporin on both big toenails once daily everyday and cover with a bandaid.    - Continue to use over-the-counter fungoid tincture on toenails, not on skin.     - Notify clinic if new or worsening condition arises.    - Follow up in 2 months for further nail trimming and recheck fungal infection.        Anyi Castano DPM

## 2019-01-08 ENCOUNTER — OFFICE VISIT (OUTPATIENT)
Dept: PODIATRY | Facility: CLINIC | Age: 24
End: 2019-01-08
Payer: COMMERCIAL

## 2019-01-08 VITALS
DIASTOLIC BLOOD PRESSURE: 66 MMHG | SYSTOLIC BLOOD PRESSURE: 98 MMHG | HEART RATE: 82 BPM | WEIGHT: 89.19 LBS | BODY MASS INDEX: 16.84 KG/M2 | HEIGHT: 61 IN

## 2019-01-08 DIAGNOSIS — S90.931D: Primary | ICD-10-CM

## 2019-01-08 PROCEDURE — 99212 OFFICE O/P EST SF 10 MIN: CPT | Mod: S$GLB,,, | Performed by: PODIATRIST

## 2019-01-08 PROCEDURE — 3008F BODY MASS INDEX DOCD: CPT | Mod: CPTII,S$GLB,, | Performed by: PODIATRIST

## 2019-01-08 PROCEDURE — 3008F PR BODY MASS INDEX (BMI) DOCUMENTED: ICD-10-PCS | Mod: CPTII,S$GLB,, | Performed by: PODIATRIST

## 2019-01-08 PROCEDURE — 99999 PR PBB SHADOW E&M-EST. PATIENT-LVL III: ICD-10-PCS | Mod: PBBFAC,,, | Performed by: PODIATRIST

## 2019-01-08 PROCEDURE — 99212 PR OFFICE/OUTPT VISIT, EST, LEVL II, 10-19 MIN: ICD-10-PCS | Mod: S$GLB,,, | Performed by: PODIATRIST

## 2019-01-08 PROCEDURE — 99999 PR PBB SHADOW E&M-EST. PATIENT-LVL III: CPT | Mod: PBBFAC,,, | Performed by: PODIATRIST

## 2019-01-08 RX ORDER — AZATHIOPRINE 50 MG/1
TABLET ORAL
COMMUNITY
Start: 2018-10-23

## 2019-01-19 PROBLEM — S90.931D: Status: ACTIVE | Noted: 2019-01-19

## 2019-01-19 NOTE — PROGRESS NOTES
Subjective:      Patient ID: Nova Johnson is a 23 y.o. female.    Chief Complaint: Follow-up (2 mo re-ck); Ingrown Toenail (rebekah great - left bleeding small amount); and Other Misc (PCP:  Dr Ryder (NATHALIA Hardy DO) 1/10/17)    HPI:  Patient presents complaining of painful discoloration and separation of the nail from the night bed on both great toes.  She denies any known history of trauma but does remark leaving toenails too long.  She rates pain as 1/10 on the pain scale when pressure is applied.  Patient denies any other pedal complaints at this time.    Review of Systems   Cardiovascular: Negative for claudication, cyanosis and leg swelling.   Skin: Positive for color change, nail changes and poor wound healing. Negative for dry skin, itching, rash, skin cancer, suspicious lesions and unusual hair distribution.   Musculoskeletal: Negative for arthritis, back pain, falls, gout, joint pain, joint swelling, muscle cramps, muscle weakness, myalgias and stiffness.   Neurological: Negative for disturbances in coordination, dizziness, focal weakness, loss of balance, numbness, paresthesias and sensory change.           Objective:      Bilateral pedal exam was performed today.    Physical Exam   Constitutional: She is oriented to person, place, and time. She appears well-developed and well-nourished. No distress.   Cardiovascular: Intact distal pulses.   Pulses:       Dorsalis pedis pulses are 2+ on the right side.        Posterior tibial pulses are 2+ on the right side.   Pedal pulses palpable 2/4 DP & PT LE.  CFT is < 3 seconds to hallux.  Skin temperature is warm to warm proximal tibia to distal toes LE without localized increase in calor noted.  No erythema, edema, or ecchymosis noted foot or ankle.  Hair growth present distally LE.     Musculoskeletal: She exhibits no edema or tenderness.        Right foot: There is normal range of motion and no deformity.   Ankle and pedal joint ROM in grossly WNL..   Ankle  joint dorsiflexion is unrestricted with the knee extended and flexed per Silfverskiold exam.    Muscle strength is 5/5 for all LE muscle groups tested.   Feet:   Right Foot:   Protective Sensation: 10 sites tested. 10 sites sensed.   Skin Integrity: Positive for skin breakdown. Negative for ulcer, blister, erythema, warmth, callus or dry skin.   Neurological: She is alert and oriented to person, place, and time. She has normal strength. She displays no atrophy and normal reflexes. No sensory deficit. She exhibits normal muscle tone. Coordination and gait normal. She displays no Babinski's sign on the right side. She displays no Babinski's sign on the left side.   Reflex Scores:       Achilles reflexes are 2+ on the right side and 2+ on the left side.  Epicritic sensation is grossly Intact to the right foot.   [place] STR is negative RLE.   No tenderness to palpation of hallux toenail.   Skin: Skin is warm. Capillary refill takes less than 2 seconds. No abrasion, no bruising, no burn, no ecchymosis, no laceration, no lesion, no petechiae and no rash noted. She is not diaphoretic. No cyanosis or erythema. Nails show no clubbing.   Hallux toenail is thickened, dystrophic, brittle, discolored, and mycotic with subungal serosanguinous crusting debris present and  from the nail bed half way through toenail with deep horizontal groove secondary from trauma. Inferior aspect of proximal lateral epinychium is protruding and exposed from underneath nail.  Toenails 2-5 are WNL in length and thickness.  Webspaces 1-4 are clean, dry, and intact.  Skin turgor is supple.  No dry, flaky skin noted to LE.     Psychiatric: She has a normal mood and affect. Her behavior is normal. Judgment and thought content normal.   Nursing note and vitals reviewed.        Assessment:       Encounter Diagnosis   Name Primary?    Superficial injury of great toe, right, subsequent encounter - Right Foot Yes         Plan:       Nova PANG  was seen today for follow-up, ingrown toenail and other misc.    Diagnoses and all orders for this visit:    Superficial injury of great toe, right, subsequent encounter - Right Foot      I counseled the patient on her conditions, their implications and medical management.    - With patient's permission, relocated epinychium with nail curette under proximal nail plate to patient's tolerance without incident.    - Applied antibiotic cream and instructed patient to keep nail borders softened with antibiotic cream to try to prevent recurrence of ingrowns and covered with bandaids as needed for occlusion.    Patient was given the following recommendations and instructions:  Patient Instructions   - Apply half pea sized amount of equal parts over-the-counter antifungal and antibiotic creams such as lamisil and neosporin on both big toenails once daily everyday and cover with a bandaid.    - Continue to use over-the-counter fungoid tincture on toenails, not on skin.     - Notify clinic if new or worsening condition arises.    - Follow up in 2 months for further nail trimming and recheck fungal infection.        Anyi Castano DPM

## 2019-03-06 ENCOUNTER — OFFICE VISIT (OUTPATIENT)
Dept: FAMILY MEDICINE | Facility: CLINIC | Age: 24
End: 2019-03-06
Payer: COMMERCIAL

## 2019-03-06 VITALS
WEIGHT: 90.94 LBS | RESPIRATION RATE: 16 BRPM | HEIGHT: 61 IN | HEART RATE: 102 BPM | BODY MASS INDEX: 17.17 KG/M2 | DIASTOLIC BLOOD PRESSURE: 64 MMHG | TEMPERATURE: 98 F | SYSTOLIC BLOOD PRESSURE: 82 MMHG

## 2019-03-06 DIAGNOSIS — J06.9 UPPER RESPIRATORY TRACT INFECTION, UNSPECIFIED TYPE: Primary | ICD-10-CM

## 2019-03-06 LAB
CTP QC/QA: YES
FLUAV AG NPH QL: NEGATIVE
FLUBV AG NPH QL: NEGATIVE

## 2019-03-06 PROCEDURE — 87804 POCT INFLUENZA A/B: ICD-10-PCS | Mod: 59,QW,S$GLB, | Performed by: FAMILY MEDICINE

## 2019-03-06 PROCEDURE — 99213 OFFICE O/P EST LOW 20 MIN: CPT | Mod: S$GLB,,, | Performed by: FAMILY MEDICINE

## 2019-03-06 PROCEDURE — 99999 PR PBB SHADOW E&M-EST. PATIENT-LVL III: ICD-10-PCS | Mod: PBBFAC,,, | Performed by: FAMILY MEDICINE

## 2019-03-06 PROCEDURE — 3008F PR BODY MASS INDEX (BMI) DOCUMENTED: ICD-10-PCS | Mod: CPTII,S$GLB,, | Performed by: FAMILY MEDICINE

## 2019-03-06 PROCEDURE — 99999 PR PBB SHADOW E&M-EST. PATIENT-LVL III: CPT | Mod: PBBFAC,,, | Performed by: FAMILY MEDICINE

## 2019-03-06 PROCEDURE — 99213 PR OFFICE/OUTPT VISIT, EST, LEVL III, 20-29 MIN: ICD-10-PCS | Mod: S$GLB,,, | Performed by: FAMILY MEDICINE

## 2019-03-06 PROCEDURE — 87804 INFLUENZA ASSAY W/OPTIC: CPT | Mod: QW,S$GLB,, | Performed by: FAMILY MEDICINE

## 2019-03-06 PROCEDURE — 3008F BODY MASS INDEX DOCD: CPT | Mod: CPTII,S$GLB,, | Performed by: FAMILY MEDICINE

## 2019-03-06 RX ORDER — FLUTICASONE PROPIONATE 50 MCG
2 SPRAY, SUSPENSION (ML) NASAL DAILY
Qty: 16 G | Refills: 1 | Status: SHIPPED | OUTPATIENT
Start: 2019-03-06

## 2019-03-06 NOTE — PROGRESS NOTES
THIS DOCUMENT WAS MADE IN PART WITH VOICE RECOGNITION SOFTWARE.  OCCASIONALLY THIS SOFTWARE WILL MISINTERPRET WORDS OR PHRASES.      Nova PANG Alex  1995    Nova PANG was seen today for facial pain and nasal congestion.    Diagnoses and all orders for this visit:    Upper respiratory tract infection, unspecified type  -     POCT Influenza A/B    Other orders  -     fluticasone (FLONASE) 50 mcg/actuation nasal spray; 2 sprays (100 mcg total) by Each Nare route once daily.     She is immunosuppressed because of treatment for Crohn's disease, but signs are consistent with a virus.  However if anything is worsening changing have a low threshold to reconsider antibiotic.  She may continue Tylenol called flu.  Will add Flonase for some congestion.    She does have some mild neck stiffness which is likely related to a viral upper respiratory infection.  She does not any findings on physical exam to suggest meningitis.  Though I did specifically advised her and her mother that if symptoms worsen or change to let us know immediately.    Subjective     Chief Complaint   Patient presents with    Facial Pain     x 2 days    Nasal Congestion     x 2 days     This is a 23-year-old female here today with upper respiratory symptoms.  She has a history of Crohn's disease and is on immunosuppressant drugs.  Facial Pain   This is a new problem. The current episode started in the past 7 days. The problem occurs constantly. The problem has been gradually worsening. Associated symptoms include chills, congestion, fatigue, headaches, nausea and neck pain (stiffness). Pertinent negatives include no abdominal pain, chest pain, coughing, fever, rash, sore throat, swollen glands, visual change or vomiting. Treatments tried: tylenol cold and flu. The treatment provided mild relief.       HPI elements addressed above in the assessment and plan including problems, diagnosis, stability/instability,  improving/worsening, and chronicity  will not be duplicated in this section. Any important additional HPI topics will be discussed here if needed.    Active Ambulatory Problems     Diagnosis Date Noted    Decreased bone density 11/06/2012    Long term current use of immunosuppressive drug 04/05/2016    Crohn's disease of both small and large intestine 09/18/2018    Irritable bowel syndrome with diarrhea 10/09/2018    Onycholysis of toenail 10/21/2018    Onychomycosis due to dermatophyte 10/21/2018    Pain in toes of both feet 10/21/2018    Superficial injury of great toe, right, subsequent encounter - Right Foot 01/19/2019     Resolved Ambulatory Problems     Diagnosis Date Noted    Tachycardia 04/05/2016    Fever 04/05/2016    Cough 04/05/2016     Past Medical History:   Diagnosis Date    Crohn's disease          Review of Systems   Constitutional: Positive for chills and fatigue. Negative for fever.   HENT: Positive for congestion. Negative for sore throat.    Respiratory: Negative for cough.    Cardiovascular: Negative for chest pain.   Gastrointestinal: Positive for nausea. Negative for abdominal pain and vomiting.   Musculoskeletal: Positive for neck pain (stiffness).   Skin: Negative for rash.   Neurological: Positive for headaches.       Objective     Physical Exam   Constitutional: She is oriented to person, place, and time. She appears well-developed and well-nourished. No distress.   HENT:   Head: Normocephalic and atraumatic.   Right Ear: Tympanic membrane, external ear and ear canal normal.   Left Ear: Tympanic membrane, external ear and ear canal normal.   Nose: Mucosal edema present.   Mouth/Throat: Oropharynx is clear and moist. No oropharyngeal exudate, posterior oropharyngeal edema, posterior oropharyngeal erythema or tonsillar abscesses.   Eyes: Conjunctivae are normal. Pupils are equal, round, and reactive to light. Right eye exhibits no discharge. Left eye exhibits no discharge. No scleral icterus.   Neck: Normal range  "of motion. Neck supple.   Neck reveals full range of motion, no rigidity no meningeal signs.   Cardiovascular: Normal rate, regular rhythm and normal heart sounds.   No murmur heard.  Pulmonary/Chest: Effort normal and breath sounds normal. No stridor. No respiratory distress. She has no wheezes.   Lymphadenopathy:     She has no cervical adenopathy.   Neurological: She is alert and oriented to person, place, and time.   Skin: Skin is dry. No rash noted. She is not diaphoretic.   Psychiatric: She has a normal mood and affect. Her behavior is normal.   Vitals reviewed.    Vitals:    03/06/19 0818 03/06/19 0846   BP: (!) 82/64    BP Location: Right arm    Patient Position: Sitting    BP Method: Large (Manual)    Pulse: (!) 115 102   Resp: 16    Temp: 98.4 °F (36.9 °C)    TempSrc: Oral    Weight: 41.3 kg (90 lb 15 oz)    Height: 5' 1" (1.549 m)        MOST RECENT LABS IN OUR ELECTRONIC MEDICAL RECORD:     Results for orders placed or performed in visit on 03/06/19   POCT Influenza A/B   Result Value Ref Range    Rapid Influenza A Ag Negative Negative    Rapid Influenza B Ag Negative Negative     Acceptable Yes          "

## 2019-05-01 ENCOUNTER — OFFICE VISIT (OUTPATIENT)
Dept: FAMILY MEDICINE | Facility: CLINIC | Age: 24
End: 2019-05-01
Payer: COMMERCIAL

## 2019-05-01 VITALS
HEIGHT: 61 IN | WEIGHT: 90.5 LBS | HEART RATE: 122 BPM | BODY MASS INDEX: 17.09 KG/M2 | SYSTOLIC BLOOD PRESSURE: 90 MMHG | OXYGEN SATURATION: 97 % | TEMPERATURE: 98 F | DIASTOLIC BLOOD PRESSURE: 62 MMHG

## 2019-05-01 DIAGNOSIS — J02.9 SORE THROAT: Primary | ICD-10-CM

## 2019-05-01 LAB
CTP QC/QA: YES
S PYO RRNA THROAT QL PROBE: NEGATIVE

## 2019-05-01 PROCEDURE — 87880 POCT RAPID STREP A: ICD-10-PCS | Mod: QW,S$GLB,, | Performed by: NURSE PRACTITIONER

## 2019-05-01 PROCEDURE — 99999 PR PBB SHADOW E&M-EST. PATIENT-LVL IV: CPT | Mod: PBBFAC,,, | Performed by: NURSE PRACTITIONER

## 2019-05-01 PROCEDURE — 99214 PR OFFICE/OUTPT VISIT, EST, LEVL IV, 30-39 MIN: ICD-10-PCS | Mod: S$GLB,,, | Performed by: NURSE PRACTITIONER

## 2019-05-01 PROCEDURE — 87147 CULTURE TYPE IMMUNOLOGIC: CPT

## 2019-05-01 PROCEDURE — 87081 CULTURE SCREEN ONLY: CPT

## 2019-05-01 PROCEDURE — 87880 STREP A ASSAY W/OPTIC: CPT | Mod: QW,S$GLB,, | Performed by: NURSE PRACTITIONER

## 2019-05-01 PROCEDURE — 3008F BODY MASS INDEX DOCD: CPT | Mod: CPTII,S$GLB,, | Performed by: NURSE PRACTITIONER

## 2019-05-01 PROCEDURE — 99999 PR PBB SHADOW E&M-EST. PATIENT-LVL IV: ICD-10-PCS | Mod: PBBFAC,,, | Performed by: NURSE PRACTITIONER

## 2019-05-01 PROCEDURE — 3008F PR BODY MASS INDEX (BMI) DOCUMENTED: ICD-10-PCS | Mod: CPTII,S$GLB,, | Performed by: NURSE PRACTITIONER

## 2019-05-01 PROCEDURE — 99214 OFFICE O/P EST MOD 30 MIN: CPT | Mod: S$GLB,,, | Performed by: NURSE PRACTITIONER

## 2019-05-01 NOTE — PATIENT INSTRUCTIONS
Chloraseptic spray for the throat.  Allegra(fexofenadine) for post nasal drip contributing to sore throat.

## 2019-05-01 NOTE — PROGRESS NOTES
This dictation has been generated using Modal Fluency Dictation some phonetic errors may occur. Please contact author for clarification if needed.     Problem List Items Addressed This Visit     None      Visit Diagnoses     Sore throat    -  Primary    Relevant Orders    POCT Rapid Strep A    Strep A culture, throat          Orders Placed This Encounter    Strep A culture, throat    POCT Rapid Strep A     No evidence of strep or mono. Testing as above  PND.   Patient Instructions   Chloraseptic spray for the throat.  Allegra(fexofenadine) for post nasal drip contributing to sore throat.        Follow up if symptoms worsen or fail to improve.    ________________________________________________________________  ________________________________________________________________      Chief Complaint   Patient presents with    Sore Throat     sx since 4/25     History of present illness  This 23 y.o. presents today for complaint of sore throat issues.  Symptoms started last Thursday.  The weekend prior sugar and a temperature.  She did not check her temperature.  Thursday she started having a sore throat.  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  She does note some gland swelling. Over the tonsillar region.  No posterior cervical adenopathy noted.  Review of systems  No chills or body aches  Denies runny nose  Patient denies cough.  No shortness of breath.  No palpitations or chest pain  Patient denies rash    Answers for HPI/ROS submitted by the patient on 5/1/2019   Sore throat  Chronicity: new  Onset: in the past 7 days  Progression since onset: gradually worsening  Pain worse on: left  Fever: no fever  Fever duration: less than 1 day  Pain - numeric: 8/10  abdominal pain: No  congestion: No  cough: No  diarrhea: No  drooling: No  ear discharge: No  ear pain: Yes  headaches: Yes  hoarse voice: No  neck pain: Yes  plugged ear sensation: Yes  shortness of breath: No  stridor: No  swollen  glands: Yes  trouble swallowing: No  vomiting: No  strep: No  mono: No  Treatments tried: acetaminophen, cool liquids, gargles  Improvement on treatment: mild  Pain severity: severe    Reviewed past medical and social histories. Pt new to me. She follows in the clinic.     Past Medical History:   Diagnosis Date    Crohn's disease        Past Surgical History:   Procedure Laterality Date    COLONOSCOPY N/A 9/18/2018    Performed by Praful Collazo MD at [facility] ([facility])       Family History   Problem Relation Age of Onset    Crohn's disease Cousin     Celiac disease Cousin     Other Other     Cirrhosis Neg Hx     Colon cancer Neg Hx     Colon polyps Neg Hx     Cystic fibrosis Neg Hx     Esophageal cancer Neg Hx     Hemochromatosis Neg Hx     Inflammatory bowel disease Neg Hx     Irritable bowel syndrome Neg Hx     Liver cancer Neg Hx     Liver disease Neg Hx     Rectal cancer Neg Hx     Stomach cancer Neg Hx     Ulcerative colitis Neg Hx     Francisco's disease Neg Hx     Tuberculosis Neg Hx     Lymphoma Neg Hx     Scleroderma Neg Hx     Rheum arthritis Neg Hx     Multiple sclerosis Neg Hx     Melanoma Neg Hx     Lupus Neg Hx     Psoriasis Neg Hx     Skin cancer Neg Hx        Social History     Socioeconomic History    Marital status: Single     Spouse name: Not on file    Number of children: Not on file    Years of education: Not on file    Highest education level: Not on file   Occupational History    Not on file   Social Needs    Financial resource strain: Not on file    Food insecurity:     Worry: Not on file     Inability: Not on file    Transportation needs:     Medical: Not on file     Non-medical: Not on file   Tobacco Use    Smoking status: Never Smoker    Smokeless tobacco: Never Used   Substance and Sexual Activity    Alcohol use: No     Frequency: Monthly or less     Drinks per session: 1 or 2     Binge frequency: Never    Drug use: No    Sexual activity: Never    Lifestyle    Physical activity:     Days per week: 0 days     Minutes per session: 0 min    Stress: Not at all   Relationships    Social connections:     Talks on phone: More than three times a week     Gets together: Three times a week     Attends [employer]oration[employer] service: Not on file     Active member of club or organization: No     Attends meetings of clubs or organizations: Never     Relationship status: Never    Other Topics Concern    Not on file   Social History Narrative    Lives with both parents.  Does well in school. Majoring in Education.        Current Outpatient Medications   Medication Sig Dispense Refill    adalimumab (HUMIRA PEN) 40 mg/0.8 mL PnKt Inject 40 mg into the skin every 14 (fourteen) days. 2 each 6    azaTHIOprine (IMURAN) 50 mg Tab azathioprine 50 mg tablet   TAKE 1 TABLET BY MOUTH EVERY DAY      calcium carbonate-vit D3-min (CALCIUM-VITAMIN D) 600 mg calcium- 400 unit Tab Take 1 tablet by mouth once daily. 30 tablet 11    fluticasone (FLONASE) 50 mcg/actuation nasal spray 2 sprays (100 mcg total) by Each Nare route once daily. 16 g 1    VITAMIN D3 1,000 unit capsule Take 2,000 Units by mouth once daily.  5     No current facility-administered medications for this visit.        Review of patient's allergies indicates:   Allergen Reactions    Sulfa (sulfonamide antibiotics)      Chest pain and difficulty breathing       Physical examination  Vitals Reviewed  Gen. Well-dressed well-nourished   Skin warm dry and intact.  No rashes noted.  HEENT.  TM intact bilateral with normal light reflex.  No mastoid tenderness during percussion.  Nares patent bilateral.  Pharynx is unremarkable except minimal postnasal drip.  No maxillary or frontal sinus tenderness when percussed.    Neck is supple with trace anterior cervical adenopathy noted. No posterior cervical adenopathy or supraclavicular adenopathy noted.  Chest.  Respirations are even unlabored.  Lungs are clear to auscultation.   Cardiac regular rate and rhythm.  No chest wall adenopathy noted.  Abdomen is soft and not distended.  Bowel sounds are present.  No tenderness during palpation of the abdomen.  No Hepatosplenomegaly noted.  No hernia noted.  No CVA tenderness to percussion.    Neuro. Awake alert oriented x4.  Normal judgment and cognition noted.  Extremities no clubbing cyanosis or edema noted.     Call or return to clinic prn if these symptoms worsen or fail to improve as anticipated.

## 2019-05-05 LAB
BACTERIA THROAT CULT: NORMAL
BACTERIA THROAT CULT: NORMAL

## 2019-07-10 LAB
HPV, HIGH-RISK: POSITIVE
PAP SMEAR: ABNORMAL

## 2019-10-29 ENCOUNTER — PATIENT OUTREACH (OUTPATIENT)
Dept: ADMINISTRATIVE | Facility: HOSPITAL | Age: 24
End: 2019-10-29

## 2020-02-27 LAB
HPV, HIGH-RISK: POSITIVE
PAP SMEAR: ABNORMAL

## 2020-04-14 ENCOUNTER — PATIENT OUTREACH (OUTPATIENT)
Dept: ADMINISTRATIVE | Facility: HOSPITAL | Age: 25
End: 2020-04-14

## 2020-10-05 ENCOUNTER — PATIENT MESSAGE (OUTPATIENT)
Dept: ADMINISTRATIVE | Facility: HOSPITAL | Age: 25
End: 2020-10-05

## 2021-01-04 ENCOUNTER — PATIENT MESSAGE (OUTPATIENT)
Dept: ADMINISTRATIVE | Facility: HOSPITAL | Age: 26
End: 2021-01-04

## 2021-03-24 ENCOUNTER — OFFICE VISIT (OUTPATIENT)
Dept: PODIATRY | Facility: CLINIC | Age: 26
End: 2021-03-24
Payer: COMMERCIAL

## 2021-03-24 VITALS
HEIGHT: 61 IN | DIASTOLIC BLOOD PRESSURE: 72 MMHG | BODY MASS INDEX: 17.94 KG/M2 | SYSTOLIC BLOOD PRESSURE: 105 MMHG | WEIGHT: 95 LBS | HEART RATE: 90 BPM

## 2021-03-24 DIAGNOSIS — L60.0 ONYCHOCRYPTOSIS: ICD-10-CM

## 2021-03-24 DIAGNOSIS — L98.0 PYOGENIC GRANULOMA OF SKIN: ICD-10-CM

## 2021-03-24 DIAGNOSIS — M79.674 PAIN AROUND TOENAIL, RIGHT FOOT: ICD-10-CM

## 2021-03-24 DIAGNOSIS — L03.031 PARONYCHIA OF GREAT TOE, RIGHT: Primary | ICD-10-CM

## 2021-03-24 PROCEDURE — 99999 PR PBB SHADOW E&M-EST. PATIENT-LVL III: CPT | Mod: PBBFAC,,, | Performed by: PODIATRIST

## 2021-03-24 PROCEDURE — 1126F PR PAIN SEVERITY QUANTIFIED, NO PAIN PRESENT: ICD-10-PCS | Mod: S$GLB,,, | Performed by: PODIATRIST

## 2021-03-24 PROCEDURE — 3008F BODY MASS INDEX DOCD: CPT | Mod: CPTII,S$GLB,, | Performed by: PODIATRIST

## 2021-03-24 PROCEDURE — 99213 OFFICE O/P EST LOW 20 MIN: CPT | Mod: S$GLB,,, | Performed by: PODIATRIST

## 2021-03-24 PROCEDURE — 1126F AMNT PAIN NOTED NONE PRSNT: CPT | Mod: S$GLB,,, | Performed by: PODIATRIST

## 2021-03-24 PROCEDURE — 99999 PR PBB SHADOW E&M-EST. PATIENT-LVL III: ICD-10-PCS | Mod: PBBFAC,,, | Performed by: PODIATRIST

## 2021-03-24 PROCEDURE — 3008F PR BODY MASS INDEX (BMI) DOCUMENTED: ICD-10-PCS | Mod: CPTII,S$GLB,, | Performed by: PODIATRIST

## 2021-03-24 PROCEDURE — 99213 PR OFFICE/OUTPT VISIT, EST, LEVL III, 20-29 MIN: ICD-10-PCS | Mod: S$GLB,,, | Performed by: PODIATRIST

## 2021-03-24 RX ORDER — AMOXICILLIN AND CLAVULANATE POTASSIUM 875; 125 MG/1; MG/1
1 TABLET, FILM COATED ORAL EVERY 12 HOURS
Qty: 28 TABLET | Refills: 0 | Status: SHIPPED | OUTPATIENT
Start: 2021-03-24 | End: 2021-05-06

## 2021-04-10 ENCOUNTER — IMMUNIZATION (OUTPATIENT)
Dept: FAMILY MEDICINE | Facility: CLINIC | Age: 26
End: 2021-04-10
Payer: COMMERCIAL

## 2021-04-10 DIAGNOSIS — Z23 NEED FOR VACCINATION: Primary | ICD-10-CM

## 2021-04-10 PROCEDURE — 0001A COVID-19, MRNA, LNP-S, PF, 30 MCG/0.3 ML DOSE VACCINE: CPT | Mod: CV19,S$GLB,, | Performed by: FAMILY MEDICINE

## 2021-04-10 PROCEDURE — 91300 COVID-19, MRNA, LNP-S, PF, 30 MCG/0.3 ML DOSE VACCINE: CPT | Mod: S$GLB,,, | Performed by: FAMILY MEDICINE

## 2021-04-10 PROCEDURE — 91300 COVID-19, MRNA, LNP-S, PF, 30 MCG/0.3 ML DOSE VACCINE: ICD-10-PCS | Mod: S$GLB,,, | Performed by: FAMILY MEDICINE

## 2021-04-10 PROCEDURE — 0001A COVID-19, MRNA, LNP-S, PF, 30 MCG/0.3 ML DOSE VACCINE: ICD-10-PCS | Mod: CV19,S$GLB,, | Performed by: FAMILY MEDICINE

## 2021-04-11 PROBLEM — L60.0 ONYCHOCRYPTOSIS: Status: ACTIVE | Noted: 2021-04-11

## 2021-04-11 PROBLEM — L98.0 PYOGENIC GRANULOMA OF SKIN: Status: ACTIVE | Noted: 2021-04-11

## 2021-04-11 PROBLEM — L03.031 PARONYCHIA OF GREAT TOE, RIGHT: Status: ACTIVE | Noted: 2021-04-11

## 2021-04-22 ENCOUNTER — PATIENT MESSAGE (OUTPATIENT)
Dept: DERMATOLOGY | Facility: CLINIC | Age: 26
End: 2021-04-22

## 2021-04-26 ENCOUNTER — OFFICE VISIT (OUTPATIENT)
Dept: PODIATRY | Facility: CLINIC | Age: 26
End: 2021-04-26
Payer: COMMERCIAL

## 2021-04-26 VITALS — HEIGHT: 61 IN | WEIGHT: 95 LBS | BODY MASS INDEX: 17.94 KG/M2

## 2021-04-26 DIAGNOSIS — B35.1 ONYCHOMYCOSIS DUE TO DERMATOPHYTE: Primary | ICD-10-CM

## 2021-04-26 PROCEDURE — 1126F PR PAIN SEVERITY QUANTIFIED, NO PAIN PRESENT: ICD-10-PCS | Mod: S$GLB,,, | Performed by: PODIATRIST

## 2021-04-26 PROCEDURE — 99999 PR PBB SHADOW E&M-EST. PATIENT-LVL III: ICD-10-PCS | Mod: PBBFAC,,, | Performed by: PODIATRIST

## 2021-04-26 PROCEDURE — 99212 PR OFFICE/OUTPT VISIT, EST, LEVL II, 10-19 MIN: ICD-10-PCS | Mod: S$GLB,,, | Performed by: PODIATRIST

## 2021-04-26 PROCEDURE — 99212 OFFICE O/P EST SF 10 MIN: CPT | Mod: S$GLB,,, | Performed by: PODIATRIST

## 2021-04-26 PROCEDURE — 1126F AMNT PAIN NOTED NONE PRSNT: CPT | Mod: S$GLB,,, | Performed by: PODIATRIST

## 2021-04-26 PROCEDURE — 3008F BODY MASS INDEX DOCD: CPT | Mod: CPTII,S$GLB,, | Performed by: PODIATRIST

## 2021-04-26 PROCEDURE — 3008F PR BODY MASS INDEX (BMI) DOCUMENTED: ICD-10-PCS | Mod: CPTII,S$GLB,, | Performed by: PODIATRIST

## 2021-04-26 PROCEDURE — 99999 PR PBB SHADOW E&M-EST. PATIENT-LVL III: CPT | Mod: PBBFAC,,, | Performed by: PODIATRIST

## 2021-04-26 RX ORDER — DOXYCYCLINE 100 MG/1
100 CAPSULE ORAL EVERY 12 HOURS
COMMUNITY
Start: 2021-01-18 | End: 2021-08-18

## 2021-04-26 RX ORDER — ERGOCALCIFEROL 1.25 MG/1
CAPSULE ORAL
COMMUNITY
End: 2023-02-28

## 2021-04-26 RX ORDER — MUPIROCIN 20 MG/G
OINTMENT TOPICAL
COMMUNITY
Start: 2021-01-18

## 2021-05-01 ENCOUNTER — IMMUNIZATION (OUTPATIENT)
Dept: FAMILY MEDICINE | Facility: CLINIC | Age: 26
End: 2021-05-01
Payer: COMMERCIAL

## 2021-05-01 DIAGNOSIS — Z23 NEED FOR VACCINATION: Primary | ICD-10-CM

## 2021-05-01 PROCEDURE — 0002A COVID-19, MRNA, LNP-S, PF, 30 MCG/0.3 ML DOSE VACCINE: CPT | Mod: PBBFAC | Performed by: INTERNAL MEDICINE

## 2021-05-01 PROCEDURE — 91300 COVID-19, MRNA, LNP-S, PF, 30 MCG/0.3 ML DOSE VACCINE: CPT | Mod: PBBFAC | Performed by: INTERNAL MEDICINE

## 2021-08-17 ENCOUNTER — PATIENT MESSAGE (OUTPATIENT)
Dept: DERMATOLOGY | Facility: CLINIC | Age: 26
End: 2021-08-17

## 2021-08-18 ENCOUNTER — OFFICE VISIT (OUTPATIENT)
Dept: DERMATOLOGY | Facility: CLINIC | Age: 26
End: 2021-08-18
Payer: COMMERCIAL

## 2021-08-18 DIAGNOSIS — L01.00 IMPETIGO: Primary | ICD-10-CM

## 2021-08-18 PROCEDURE — 1159F MED LIST DOCD IN RCRD: CPT | Mod: CPTII,,, | Performed by: DERMATOLOGY

## 2021-08-18 PROCEDURE — 1159F PR MEDICATION LIST DOCUMENTED IN MEDICAL RECORD: ICD-10-PCS | Mod: CPTII,,, | Performed by: DERMATOLOGY

## 2021-08-18 PROCEDURE — 99203 PR OFFICE/OUTPT VISIT, NEW, LEVL III, 30-44 MIN: ICD-10-PCS | Mod: 95,,, | Performed by: DERMATOLOGY

## 2021-08-18 PROCEDURE — 99203 OFFICE O/P NEW LOW 30 MIN: CPT | Mod: 95,,, | Performed by: DERMATOLOGY

## 2021-08-18 PROCEDURE — 1160F RVW MEDS BY RX/DR IN RCRD: CPT | Mod: CPTII,,, | Performed by: DERMATOLOGY

## 2021-08-18 PROCEDURE — 1160F PR REVIEW ALL MEDS BY PRESCRIBER/CLIN PHARMACIST DOCUMENTED: ICD-10-PCS | Mod: CPTII,,, | Performed by: DERMATOLOGY

## 2021-08-18 RX ORDER — DOXYCYCLINE 100 MG/1
100 TABLET ORAL 2 TIMES DAILY WITH MEALS
Qty: 14 TABLET | Refills: 0 | Status: SHIPPED | OUTPATIENT
Start: 2021-08-18 | End: 2021-08-25

## 2021-08-24 RX ORDER — PRENATAL VIT 91/IRON/FOLIC/DHA 28-975-200
COMBINATION PACKAGE (EA) ORAL 2 TIMES DAILY
Qty: 28.4 G | Refills: 1 | Status: SHIPPED | OUTPATIENT
Start: 2021-08-24

## 2021-09-09 ENCOUNTER — PATIENT MESSAGE (OUTPATIENT)
Dept: DERMATOLOGY | Facility: CLINIC | Age: 26
End: 2021-09-09

## 2021-09-09 RX ORDER — TERBINAFINE HYDROCHLORIDE 250 MG/1
250 TABLET ORAL DAILY
Qty: 30 TABLET | Refills: 0 | Status: SHIPPED | OUTPATIENT
Start: 2021-09-09 | End: 2021-10-09

## 2021-09-27 ENCOUNTER — PATIENT MESSAGE (OUTPATIENT)
Dept: DERMATOLOGY | Facility: CLINIC | Age: 26
End: 2021-09-27

## 2021-09-28 RX ORDER — CLOBETASOL PROPIONATE 0.5 MG/G
CREAM TOPICAL 2 TIMES DAILY
Qty: 60 G | Refills: 0 | Status: SHIPPED | OUTPATIENT
Start: 2021-09-28

## 2023-02-28 ENCOUNTER — TELEPHONE (OUTPATIENT)
Dept: FAMILY MEDICINE | Facility: CLINIC | Age: 28
End: 2023-02-28
Payer: COMMERCIAL

## 2023-02-28 NOTE — TELEPHONE ENCOUNTER
----- Message from Pj Rodriguez sent at 2/28/2023  3:23 PM CST -----  Contact: self  Type: Needs Medical Advice  Who Called: Patient   Best Call Back Number: [phone]  Additional Information: Patient needs to be seen tomorrow morning has diarrhea, migraines cant keep food or water down plz call to get scheduled. Thanks

## 2024-09-05 ENCOUNTER — OFFICE VISIT (OUTPATIENT)
Dept: OBSTETRICS AND GYNECOLOGY | Facility: CLINIC | Age: 29
End: 2024-09-05
Payer: COMMERCIAL

## 2024-09-05 VITALS
DIASTOLIC BLOOD PRESSURE: 66 MMHG | OXYGEN SATURATION: 99 % | RESPIRATION RATE: 18 BRPM | HEART RATE: 108 BPM | BODY MASS INDEX: 19.78 KG/M2 | SYSTOLIC BLOOD PRESSURE: 102 MMHG | WEIGHT: 104.75 LBS | HEIGHT: 61 IN

## 2024-09-05 DIAGNOSIS — Z87.42 HISTORY OF ABNORMAL CERVICAL PAP SMEAR: ICD-10-CM

## 2024-09-05 DIAGNOSIS — G43.821 MENSTRUAL MIGRAINE WITH STATUS MIGRAINOSUS, NOT INTRACTABLE: ICD-10-CM

## 2024-09-05 DIAGNOSIS — Z12.4 PAP SMEAR FOR CERVICAL CANCER SCREENING: ICD-10-CM

## 2024-09-05 DIAGNOSIS — Z01.419 WELL WOMAN EXAM WITH ROUTINE GYNECOLOGICAL EXAM: Primary | ICD-10-CM

## 2024-09-05 PROCEDURE — 3008F BODY MASS INDEX DOCD: CPT | Mod: CPTII,S$GLB,, | Performed by: FAMILY MEDICINE

## 2024-09-05 PROCEDURE — 3078F DIAST BP <80 MM HG: CPT | Mod: CPTII,S$GLB,, | Performed by: FAMILY MEDICINE

## 2024-09-05 PROCEDURE — 1160F RVW MEDS BY RX/DR IN RCRD: CPT | Mod: CPTII,S$GLB,, | Performed by: FAMILY MEDICINE

## 2024-09-05 PROCEDURE — 99385 PREV VISIT NEW AGE 18-39: CPT | Mod: S$GLB,,, | Performed by: FAMILY MEDICINE

## 2024-09-05 PROCEDURE — 1159F MED LIST DOCD IN RCRD: CPT | Mod: CPTII,S$GLB,, | Performed by: FAMILY MEDICINE

## 2024-09-05 PROCEDURE — 3074F SYST BP LT 130 MM HG: CPT | Mod: CPTII,S$GLB,, | Performed by: FAMILY MEDICINE

## 2024-09-05 PROCEDURE — 99999 PR PBB SHADOW E&M-EST. PATIENT-LVL IV: CPT | Mod: PBBFAC,,, | Performed by: FAMILY MEDICINE

## 2024-09-05 RX ORDER — VEDOLIZUMAB 300 MG/5ML
INJECTION, POWDER, LYOPHILIZED, FOR SOLUTION INTRAVENOUS
COMMUNITY
Start: 2024-07-18

## 2024-09-05 RX ORDER — SUMATRIPTAN SUCCINATE 25 MG/1
50 TABLET ORAL
Qty: 10 TABLET | Refills: 3 | Status: SHIPPED | OUTPATIENT
Start: 2024-09-05 | End: 2024-10-05

## 2024-09-05 NOTE — PROGRESS NOTES
HISTORY OF THE PRESENT ILLNESS    09/05/2024  Nova Roque is a 29 y.o. here for Annual Exam  History of abnormal pap and positive HPV  Reports in the last 6 months started with menstrual migraines. Reports has history of migraines but recently only with menses    G'sP's: No obstetric history on file.  LMP: Patient's last menstrual period was 08/26/2024 (exact date).  Relationship:  and sexually active  Contraception: condoms    PAP'S: h/o abnormal & still being followed:    PAP: ASCUS / HPV +  / Date: 9/5/2024    HPV Vaccine: complete       LABS & RADS   Lab Results   Component Value Date    WBC 6.31 10/08/2018    HGB 13.3 10/08/2018    HCT 40.3 10/08/2018     (H) 10/08/2018    MCV 96 10/08/2018      Lab Results   Component Value Date    TSH 0.893 08/27/2018      Lab Results   Component Value Date    HEPCAB Negative 08/27/2018    [id number] Negative 08/27/2018        GYNECOLOGIC HISTORY      Genital HSV: no  STD Hx: no past history    Menarche:    Period duration: 5-6 days  # Heavy Days: 2  Pad/tampon ? on heavy days: every few hours  Intermenstrual bleeding: No  Period frequency:regular every 28-30 days    OBSTETRIC HISTORY  OB History   No obstetric history on file.       PAST MEDICAL HISTORY  -------------------------------------    Crohn's disease         PAST SURGICAL HISTORY  ----------------------------    Colonoscopy    Procedure: COLONOSCOPY;  Surgeon: Praful Collazo MD;  Location: [address]);  Service: Endoscopy;  Laterality: N/A;  schedule for 30 minutes, week of 9/10    Colonoscopy    Procedure: COLONOSCOPY;  Surgeon: Gavino Soni MD;  Location: [facility];  Service: Endoscopy;  Laterality: N/A;    Esophagogastroduodenoscopy    as a child         ALLERGIES  Review of patient's allergies indicates:  No Known Allergies    MEDICATIONS  Current Outpatient Medications   Medication Instructions    adalimumab (HUMIRA PEN) 40 mg, Subcutaneous, Every 14 days     azaTHIOprine (IMURAN) 50 mg Tab azathioprine 50 mg tablet   TAKE 1 TABLET BY MOUTH EVERY DAY    clobetasoL (TEMOVATE) 0.05 % cream Topical (Top), 2 times daily, For 2-4 weeks per course    ENTYVIO 300 mg SolR injection Inject into the vein.    fluticasone propionate (FLONASE) 100 mcg, Each Nostril, Daily    mupirocin (BACTROBAN) 2 % ointment APPLY TO AFFECTED AREA 3 TIMES A DAY FOR 10 DAYS    sumatriptan (IMITREX) 50 mg, Oral, Every 2 hours PRN, Max dose 200 mg in 24 hours    terbinafine HCL (LAMISIL AT) 1 % cream Topical (Top), 2 times daily       SOCIAL HISTORY  Social History     Tobacco Use    Smoking status: Never    Smokeless tobacco: Never   Substance Use Topics    Alcohol use: No    Drug use: No      Lives with:   Domestic Violence: no      FAMILY HISTORY  BLEEDING or  CLOTTING DISORDERS: none  BREAST CA: maternal aunt  UTERINE CA: none  OVARIAN CA: none  COLON CA: none    REVIEW OF SYSTEMS  Review of Systems   Constitutional:  Negative for activity change, appetite change and fever.   Respiratory:  Negative for cough and shortness of breath.    Genitourinary:  Negative for dysuria, flank pain, frequency, pelvic pain, urgency and urinary incontinence.   Psychiatric/Behavioral:  Negative for depression.      --------------------------------------------------------------------------------------------------------------    PHYSICAL EXAM  VITALS:  Vitals:    09/05/24 1447   BP: 102/66   Pulse: 108   Resp: 18     Exam conducted with a chaperone present.     Physical Exam:   Constitutional: She is oriented to person, place, and time. She appears well-developed and well-nourished.    HENT:   Head: Normocephalic.    Eyes: Pupils are equal, round, and reactive to light. Conjunctivae and EOM are normal.      Pulmonary/Chest: Effort normal.          Genitourinary:    Inguinal canal, vagina, uterus, right adnexa, left adnexa and rectum normal.      Pelvic exam was performed with patient in the lithotomy position.  "  The external female genitalia was normal.   Genitalia hair distrobution normal .   Cervix is normal. Vagina was moist.   pap smear completedUterus consistancy normal and Uerus contour normal            Musculoskeletal: Normal range of motion and moves all extremeties.       Neurological: She is alert and oriented to person, place, and time.    Skin: Skin is warm and dry.    Psychiatric: She has a normal mood and affect.          ASSESSMENT AND PLAN:  Nova Johnson Mya 29 y.o.   Nova Britt" was seen today for annual exam.    Diagnoses and all orders for this visit:    Well woman exam with routine gynecological exam  -     HPV High Risk Genotypes, PCR  -     Liquid-Based Pap Smear, Screening    Pap smear for cervical cancer screening  -     HPV High Risk Genotypes, PCR  -     Liquid-Based Pap Smear, Screening    History of abnormal cervical Pap smear  -     HPV High Risk Genotypes, PCR  -     Liquid-Based Pap Smear, Screening    Menstrual migraine with status migrainosus, not intractable  -     sumatriptan (IMITREX) 25 MG Tab; Take 2 tablets (50 mg total) by mouth every 2 (two) hours as needed (migraine headache). Max dose 200 mg in 24 hours        Cervical Cancer screening: f/u results of PAP / HPV --> if both negative then next screen in 1 yrs  HPV Vaccine: complete    Patient was counseled today on :  Pap guidelines  Recommend periodic pelvic exams with visual inspection and palpation  mammograms starting annually at age 40  Encouraged self breast awareness; RTC for breast concerns  Colonoscopy after the age of 50  Dexa Bone Scan and calcium and vitamin D supplementation in menopause and to see her PCP for other health maintenance.     RTC for periodic GYN exam, sooner prn      Saira Naranjo     Follow up for as needed for OB/GYN concerns.   No future appointments.        Tests to Keep You Healthy    Cervical Cancer Screening: ORDERED       "